# Patient Record
Sex: FEMALE | Race: BLACK OR AFRICAN AMERICAN | NOT HISPANIC OR LATINO | Employment: PART TIME | URBAN - METROPOLITAN AREA
[De-identification: names, ages, dates, MRNs, and addresses within clinical notes are randomized per-mention and may not be internally consistent; named-entity substitution may affect disease eponyms.]

---

## 2017-06-15 ENCOUNTER — OFFICE VISIT (OUTPATIENT)
Dept: FAMILY MEDICINE | Facility: CLINIC | Age: 27
End: 2017-06-15
Payer: MEDICAID

## 2017-06-15 VITALS
SYSTOLIC BLOOD PRESSURE: 119 MMHG | HEART RATE: 94 BPM | DIASTOLIC BLOOD PRESSURE: 76 MMHG | OXYGEN SATURATION: 100 % | RESPIRATION RATE: 20 BRPM | WEIGHT: 134.4 LBS | HEIGHT: 66 IN | BODY MASS INDEX: 21.6 KG/M2 | TEMPERATURE: 99 F

## 2017-06-15 DIAGNOSIS — Z32.01 PREGNANCY TEST POSITIVE: Primary | ICD-10-CM

## 2017-06-15 LAB — BETA HCG QUAL IFA URINE: POSITIVE

## 2017-06-15 PROCEDURE — 99212 OFFICE O/P EST SF 10 MIN: CPT | Performed by: PHYSICIAN ASSISTANT

## 2017-06-15 PROCEDURE — 84703 CHORIONIC GONADOTROPIN ASSAY: CPT | Performed by: PHYSICIAN ASSISTANT

## 2017-06-15 RX ORDER — PRENATAL VIT/IRON FUM/FOLIC AC 27MG-0.8MG
1 TABLET ORAL DAILY
Qty: 100 TABLET | Refills: 3 | Status: SHIPPED | OUTPATIENT
Start: 2017-06-15 | End: 2018-07-09

## 2017-06-15 NOTE — PROGRESS NOTES
SUBJECTIVE:                                                    Reji Snyder is a 27 year old female who presents to clinic today for the following health issues:      Concern - Pregnancy Test         Description:   Pt reports taking a home pregnancy test this morning and it was positive. LMP 05/9/2017.  She is not taking birth control, no other health problems.  No bleeding or abdominal pain, no fever or vomiting.  She does not take any regular medications.  Not yet taking prenatal vitamins              Therapies Tried and outcome:           Problem list and histories reviewed & adjusted, as indicated.  Additional history: as documented    Patient Active Problem List   Diagnosis     Dysmenorrhea     CARDIOVASCULAR SCREENING; LDL GOAL LESS THAN 160     Esophageal reflux     Seasonal allergic rhinitis     Vitamin D deficiency     Cystic acne     ASCUS favor benign     Past Surgical History:   Procedure Laterality Date     ------------OTHER-------------      Female Circumcision       Social History   Substance Use Topics     Smoking status: Never Smoker     Smokeless tobacco: Never Used     Alcohol use No     Family History   Problem Relation Age of Onset     Breast Cancer No family hx of      Cancer - colorectal No family hx of      DIABETES No family hx of      C.A.D. No family hx of          Current Outpatient Prescriptions   Medication Sig Dispense Refill     Prenatal Vit-Fe Fumarate-FA (PRENATAL MULTIVITAMIN  PLUS IRON) 27-0.8 MG TABS per tablet Take 1 tablet by mouth daily 100 tablet 3     omeprazole 20 MG tablet Take 1 tablet (20 mg) by mouth daily Take 30-60 minutes before a meal. (Patient not taking: Reported on 6/15/2017) 90 tablet 1     Cholecalciferol (VITAMIN D) 2000 UNITS tablet Take 2,000 Units by mouth daily (Patient not taking: Reported on 6/15/2017) 90 tablet 1     No Known Allergies    Reviewed and updated as needed this visit by clinical staff       Reviewed and updated as needed this visit by  "Provider         ROS:  Constitutional, HEENT, cardiovascular, pulmonary, gi and gu systems are negative, except as otherwise noted.    OBJECTIVE:                                                    /76 (BP Location: Right arm, Patient Position: Chair, Cuff Size: Adult Regular)  Pulse 94  Temp 99  F (37.2  C) (Tympanic)  Resp 20  Ht 5' 6.25\" (1.683 m)  Wt 134 lb 6.4 oz (61 kg)  LMP 05/05/2017 (Approximate)  SpO2 100%  BMI 21.53 kg/m2  Body mass index is 21.53 kg/(m^2).  GENERAL: healthy, alert and no distress  RESP: lungs clear to auscultation - no rales, rhonchi or wheezes  CV: regular rate and rhythm, normal S1 S2, no S3 or S4, no murmur  Abd: soft non tender, bowel sounds WNL  PSYCH: mentation appears normal, affect normal/bright    Diagnostic Test Results:  Results for orders placed or performed in visit on 06/15/17 (from the past 24 hour(s))   Beta HCG qual IFA urine   Result Value Ref Range    Beta HCG Qual IFA Urine Positive (A) NEG        ASSESSMENT/PLAN:                                                        1. Pregnancy test positive  Test + approx 5 weeks, advised she follow up with OB/GYN for prenatal appointment.  Referral given.  She kari begin daily prenatal vitamin.  Questions answered today.  Discussed foods to avoid, exercise and medications to avoid. She is understanding of next steps in care and will call with questions.  - Beta HCG qual IFA urine  - OB/GYN REFERRAL  - Prenatal Vit-Fe Fumarate-FA (PRENATAL MULTIVITAMIN  PLUS IRON) 27-0.8 MG TABS per tablet; Take 1 tablet by mouth daily  Dispense: 100 tablet; Refill: 3    See Patient Instructions    Jeronimo Garnica PA-C  [facility]  "

## 2017-06-15 NOTE — MR AVS SNAPSHOT
After Visit Summary   6/15/2017    Reji Snyder    MRN: [id number]           Patient Information     Date Of Birth          1990        Visit Information        Provider Department      6/15/2017 12:40 PM Jeronimo Garnica PA-C [facility] [place]        Today's Diagnoses     Pregnancy test positive    -  1      Care Instructions                   Diet During Pregnancy  In this discussion you will learn why you need a well-balanced diet while you are pregnant and what foods you should eat. You will also find out foods you should avoid and foods that will help some of the unpleasant side effects of pregnancy.   What foods do I need to eat?   Eating regular, well-balanced meals is more important when you are pregnant than at any other time of your life. What you eat provides food for your baby as well as yourself. The best time to begin eating a healthy, balanced diet is before you become pregnant.   You need about 300 more food calories a day than when you were not pregnant. Your healthcare provider will suggest a range of weight that you should gain. The usual recommended gain is about 20 to 35 pounds.   Your need for protein while pregnant is about 60 grams (g) a day. Many women already eat this amount or more of protein daily when they are not pregnant. However, if you are vegetarian or eat little meat or dairy, you may not be getting enough protein in your diet. You also need more vitamins and minerals, especially folic acid and iron. These nutrients are important for your baby's growth and development. They give your baby strong bones and teeth, healthy skin, and a healthy body.   Foods that are excellent sources of protein and vitamins are:   beans and peas   nuts   peanut butter   eggs   meat   fish   poultry   cheese, milk, and yogurt   Good sources of folic acid (also called folate) are:   leafy green vegetables, such as bahman greens, spinach, kale, and mustard greens    broccoli   asparagus   fortified breakfast cereals and grains   beans   oranges and strawberries   yellow squash   tomato juice   Foods rich in iron are:   lean red meats, pork, chicken, and fish   fortified cereals   dried fruit   leafy green vegetables   beans   eggs   liver   kidneys   whole-grain or enriched bread   If you need advice on what foods to eat for a healthy, balanced diet, ask your healthcare provider to refer you to a dietician. If you need financial help buying nutritious foods, a government program called the Special Supplemental Food Program for Women, Infants, and Children [place] can help you buy foods like milk, eggs, cheese, and bread.   How do I know if I am eating a balanced diet?   Eat a variety of whole, fresh foods. Use the following as a guideline for what you should eat every day.   Meat, poultry, fish, beans, or eggs   You need 2 to 3 servings every day.   One serving of meat is 2 to 3 ounces (oz) of lean meat, poultry, or fish.   Single servings of other foods in this food group are 1 cup cooked beans, 2 eggs, 4 egg whites, 1/2 cup tofu, 1/2 cup nuts, or 1/4 cup peanut butter. Note that nuts and peanut butter, although healthy, are very high in calories and should be eaten in moderation, especially if you are gaining more weight than your healthcare provider recommends.   Grains, rice, pasta, bread   It is good to have 6 to 9 servings every day.   One serving is 1/2 cup pasta, 1/2 cup cooked cereal, or 1 slice of bread.   Choose less-processed, higher-fiber whole grains more often.   Fruits   You need 3 or more servings of fruits every day.   One serving of fruit is 1 medium apple, 1 medium banana, 1/2 cup chopped fruit, or 3/4 cup fruit juice.   Vegetables   You need 3 or more servings of vegetables every day.   In general, 1 cup of cooked or raw vegetables or vegetable juice or 2 cups of raw leafy vegetables would be considered a serving.   Milk, cheese, or yogurt   You need 3 to 4  servings every day.   One serving is 1 cup of milk, 1 cup of yogurt, 1 and 1/2 ounces of hard cheese, or 2 ounces of processed cheese. It's best to choose low-fat or nonfat dairy products.   What if I am gaining more weight than my provider recommends?   Keep eating the recommended servings for all of the food groups, but make lower fat choices.   Avoid high-fat, high-sugar treats and high-calorie drinks, such as soda pop or large servings of juice.   Get enough exercise at the level your healthcare provider recommends.   For a more individualized approach to meal planning during your pregnancy, go to MyPyramid Plan for Moms at http://www.mypyramid.gov/mypyramidmoms/pyramidmoms_plan.aspx          Follow-ups after your visit        Additional Services     OB/GYN REFERRAL       Your provider has referred you to:  [facility]: [facility] [phone]  [email or web address]    Please be aware that coverage of these services is subject to the terms and limitations of your health insurance plan.  Call member services at your health plan with any benefit or coverage questions.      Please bring the following with you to your appointment:    (1) Any X-Rays, CTs or MRIs which have been performed.  Contact the facility where they were done to arrange for  prior to your scheduled appointment.   (2) List of current medications   (3) This referral request   (4) Any documents/labs given to you for this referral                  Who to contact     If you have questions or need follow up information about today's clinic visit or your schedule please contact [facility] [place] directly at [phone].  Normal or non-critical lab and imaging results will be communicated to you by MyChart, letter or phone within 4 business days after the clinic has received the results. If you do not hear from us within 7 days, please contact the clinic through MyChart or phone. If  "you have a critical or abnormal lab result, we will notify you by phone as soon as possible.  Submit refill requests through [employer] or call your pharmacy and they will forward the refill request to us. Please allow 3 business days for your refill to be completed.          Additional Information About Your Visit        [facility] [employer] Information     [employer] lets you send messages to your doctor, view your test results, renew your prescriptions, schedule appointments and more. To sign up, go to [email or web address] . Click on \"Log in\" on the left side of the screen, which will take you to the Welcome page. Then click on \"Sign up Now\" on the right side of the page.     You will be asked to enter the access code listed below, as well as some personal information. Please follow the directions to create your username and password.     Your access code is: [email or web address]  Expires: 2017  1:06 PM     Your access code will  in 90 days. If you need help or a new code, please call your [facility] clinic or [phone].        Care EveryWhere ID     This is your Care EveryWhere ID. This could be used by other organizations to access your [facility] medical records  [id number]        Your Vitals Were     Pulse Temperature Respirations Height Last Period Pulse Oximetry    94 99  F (37.2  C) (Tympanic) 20 5' 6.25\" (1.683 m) 2017 (Approximate) 100%    BMI (Body Mass Index)                   21.53 kg/m2            Blood Pressure from Last 3 Encounters:   06/15/17 119/76   16 119/75   16 107/72    Weight from Last 3 Encounters:   06/15/17 134 lb 6.4 oz (61 kg)   16 126 lb (57.2 kg)   16 125 lb (56.7 kg)              We Performed the Following     Beta HCG qual IFA urine     OB/GYN REFERRAL          Today's Medication Changes          These changes are accurate as of: 6/15/17  1:06 PM.  If you have any questions, ask your nurse or doctor.               Start taking these medicines.        " Dose/Directions    prenatal multivitamin  plus iron 27-0.8 MG Tabs per tablet   Used for:  Pregnancy test positive   Started by:  Jeronimo Garnica PA-C        Dose:  1 tablet   Take 1 tablet by mouth daily   Quantity:  100 tablet   Refills:  3            Where to get your medicines      These medications were sent to [id number] - [place], [place] - [address] AT [address]  [address] [address]     Phone:  [phone]     prenatal multivitamin  plus iron 27-0.8 MG Tabs per tablet                Primary Care Provider Office Phone # Fax #    Aide Rodriguez DEJAN [phone]       [address]  [place] [address]        Thank you!     [facility] you [facility]. Our goal is always to provide you with excellent care. Hearing back from our patients is one way we can continue to improve our services. Please take a few minutes to complete the written survey that you may receive in the mail after your visit with us. Thank you!             Your Updated Medication List - Protect others around you: Learn how to safely use, store and throw away your medicines at www.disposemymeds.org.          This list is accurate as of: 6/15/17  1:06 PM.  Always use your most recent med list.                   Brand Name Dispense Instructions for use    omeprazole 20 MG tablet     90 tablet    Take 1 tablet (20 mg) by mouth daily Take 30-60 minutes before a meal.       prenatal multivitamin  plus iron 27-0.8 MG Tabs per tablet     100 tablet    Take 1 tablet by mouth daily       vitamin D 2000 UNITS tablet     90 tablet    Take 2,000 Units by mouth daily

## 2017-06-15 NOTE — PATIENT INSTRUCTIONS
Diet During Pregnancy  In this discussion you will learn why you need a well-balanced diet while you are pregnant and what foods you should eat. You will also find out foods you should avoid and foods that will help some of the unpleasant side effects of pregnancy.   What foods do I need to eat?   Eating regular, well-balanced meals is more important when you are pregnant than at any other time of your life. What you eat provides food for your baby as well as yourself. The best time to begin eating a healthy, balanced diet is before you become pregnant.   You need about 300 more food calories a day than when you were not pregnant. Your healthcare provider will suggest a range of weight that you should gain. The usual recommended gain is about 20 to 35 pounds.   Your need for protein while pregnant is about 60 grams (g) a day. Many women already eat this amount or more of protein daily when they are not pregnant. However, if you are vegetarian or eat little meat or dairy, you may not be getting enough protein in your diet. You also need more vitamins and minerals, especially folic acid and iron. These nutrients are important for your baby's growth and development. They give your baby strong bones and teeth, healthy skin, and a healthy body.   Foods that are excellent sources of protein and vitamins are:   beans and peas   nuts   peanut butter   eggs   meat   fish   poultry   cheese, milk, and yogurt   Good sources of folic acid (also called folate) are:   leafy green vegetables, such as bahman greens, spinach, kale, and mustard greens   broccoli   asparagus   fortified breakfast cereals and grains   beans   oranges and strawberries   yellow squash   tomato juice   Foods rich in iron are:   lean red meats, pork, chicken, and fish   fortified cereals   dried fruit   leafy green vegetables   beans   eggs   liver   kidneys   whole-grain or enriched bread   If you need advice on what foods to eat for a  healthy, balanced diet, ask your healthcare provider to refer you to a dietician. If you need financial help buying nutritious foods, a government program called the Special Supplemental Food Program for Women, Infants, and Children ([place]) can help you buy foods like milk, eggs, cheese, and bread.   How do I know if I am eating a balanced diet?   Eat a variety of whole, fresh foods. Use the following as a guideline for what you should eat every day.   Meat, poultry, fish, beans, or eggs   You need 2 to 3 servings every day.   One serving of meat is 2 to 3 ounces (oz) of lean meat, poultry, or fish.   Single servings of other foods in this food group are 1 cup cooked beans, 2 eggs, 4 egg whites, 1/2 cup tofu, 1/2 cup nuts, or 1/4 cup peanut butter. Note that nuts and peanut butter, although healthy, are very high in calories and should be eaten in moderation, especially if you are gaining more weight than your healthcare provider recommends.   Grains, rice, pasta, bread   It is good to have 6 to 9 servings every day.   One serving is 1/2 cup pasta, 1/2 cup cooked cereal, or 1 slice of bread.   Choose less-processed, higher-fiber whole grains more often.   Fruits   You need 3 or more servings of fruits every day.   One serving of fruit is 1 medium apple, 1 medium banana, 1/2 cup chopped fruit, or 3/4 cup fruit juice.   Vegetables   You need 3 or more servings of vegetables every day.   In general, 1 cup of cooked or raw vegetables or vegetable juice or 2 cups of raw leafy vegetables would be considered a serving.   Milk, cheese, or yogurt   You need 3 to 4 servings every day.   One serving is 1 cup of milk, 1 cup of yogurt, 1 and 1/2 ounces of hard cheese, or 2 ounces of processed cheese. It's best to choose low-fat or nonfat dairy products.   What if I am gaining more weight than my provider recommends?   Keep eating the recommended servings for all of the food groups, but make lower fat choices.   Avoid high-fat,  high-sugar treats and high-calorie drinks, such as soda pop or large servings of juice.   Get enough exercise at the level your healthcare provider recommends.   For a more individualized approach to meal planning during your pregnancy, go to MyPyramid Plan for Moms at http://www.mypyramid.gov/mypyramidmoms/pyramidmoms_plan.aspx

## 2017-06-15 NOTE — NURSING NOTE
"Chief Complaint   Patient presents with     Pregnancy Test       Initial /76 (BP Location: Right arm, Patient Position: Chair, Cuff Size: Adult Regular)  Pulse 94  Temp 99  F (37.2  C) (Tympanic)  Resp 20  Ht 5' 6.25\" (1.683 m)  Wt 134 lb 6.4 oz (61 kg)  LMP 05/05/2017 (Approximate)  SpO2 100%  BMI 21.53 kg/m2 Estimated body mass index is 21.53 kg/(m^2) as calculated from the following:    Height as of this encounter: 5' 6.25\" (1.683 m).    Weight as of this encounter: 134 lb 6.4 oz (61 kg).  Medication Reconciliation: complete    Latisha Zazueta MA  "

## 2017-08-09 ENCOUNTER — TELEPHONE (OUTPATIENT)
Dept: NURSING | Facility: CLINIC | Age: 27
End: 2017-08-09

## 2017-08-09 NOTE — TELEPHONE ENCOUNTER
"Pt calling in with complaints of morning sickness. She says she has not been seen in the office yet. Has appt later this month. She feels she is about 8 weeks pregnant. Reviewed \"eating tips for morning sickness\" . Also suggested the Vitamin B6  3x daily.  Pt voiced understanding.  "

## 2017-08-21 DIAGNOSIS — O36.80X0 ENCOUNTER TO DETERMINE FETAL VIABILITY OF PREGNANCY, NOT APPLICABLE OR UNSPECIFIED FETUS: Primary | ICD-10-CM

## 2017-08-24 ENCOUNTER — PRENATAL OFFICE VISIT (OUTPATIENT)
Dept: OBGYN | Facility: CLINIC | Age: 27
End: 2017-08-24
Payer: COMMERCIAL

## 2017-08-24 ENCOUNTER — RADIANT APPOINTMENT (OUTPATIENT)
Dept: ULTRASOUND IMAGING | Facility: CLINIC | Age: 27
End: 2017-08-24
Payer: COMMERCIAL

## 2017-08-24 VITALS
HEIGHT: 66 IN | BODY MASS INDEX: 21.18 KG/M2 | SYSTOLIC BLOOD PRESSURE: 108 MMHG | DIASTOLIC BLOOD PRESSURE: 62 MMHG | HEART RATE: 88 BPM | WEIGHT: 131.8 LBS

## 2017-08-24 DIAGNOSIS — O36.80X0 ENCOUNTER TO DETERMINE FETAL VIABILITY OF PREGNANCY, NOT APPLICABLE OR UNSPECIFIED FETUS: ICD-10-CM

## 2017-08-24 DIAGNOSIS — Z34.01 ENCOUNTER FOR SUPERVISION OF NORMAL FIRST PREGNANCY IN FIRST TRIMESTER: Primary | ICD-10-CM

## 2017-08-24 PROBLEM — Z34.00 SUPERVISION OF NORMAL IUP (INTRAUTERINE PREGNANCY) IN PRIMIGRAVIDA: Status: ACTIVE | Noted: 2017-08-24

## 2017-08-24 LAB
ABO + RH BLD: NORMAL
ABO + RH BLD: NORMAL
ALBUMIN UR-MCNC: NEGATIVE MG/DL
APPEARANCE UR: CLEAR
BACTERIA #/AREA URNS HPF: ABNORMAL /HPF
BASOPHILS # BLD AUTO: 0 10E9/L (ref 0–0.2)
BASOPHILS NFR BLD AUTO: 0.1 %
BILIRUB UR QL STRIP: NEGATIVE
BLD GP AB SCN SERPL QL: NORMAL
BLOOD BANK CMNT PATIENT-IMP: NORMAL
COLOR UR AUTO: YELLOW
DIFFERENTIAL METHOD BLD: NORMAL
EOSINOPHIL # BLD AUTO: 0.1 10E9/L (ref 0–0.7)
EOSINOPHIL NFR BLD AUTO: 0.9 %
ERYTHROCYTE [DISTWIDTH] IN BLOOD BY AUTOMATED COUNT: 12.9 % (ref 10–15)
GLUCOSE UR STRIP-MCNC: NEGATIVE MG/DL
HCT VFR BLD AUTO: 37.8 % (ref 35–47)
HGB BLD-MCNC: 13.4 G/DL (ref 11.7–15.7)
HGB UR QL STRIP: ABNORMAL
KETONES UR STRIP-MCNC: NEGATIVE MG/DL
LEUKOCYTE ESTERASE UR QL STRIP: NEGATIVE
LYMPHOCYTES # BLD AUTO: 1.6 10E9/L (ref 0.8–5.3)
LYMPHOCYTES NFR BLD AUTO: 22.6 %
MCH RBC QN AUTO: 30.7 PG (ref 26.5–33)
MCHC RBC AUTO-ENTMCNC: 35.4 G/DL (ref 31.5–36.5)
MCV RBC AUTO: 87 FL (ref 78–100)
MONOCYTES # BLD AUTO: 0.6 10E9/L (ref 0–1.3)
MONOCYTES NFR BLD AUTO: 7.8 %
MUCOUS THREADS #/AREA URNS LPF: PRESENT /LPF
NEUTROPHILS # BLD AUTO: 4.8 10E9/L (ref 1.6–8.3)
NEUTROPHILS NFR BLD AUTO: 68.6 %
NITRATE UR QL: NEGATIVE
NON-SQ EPI CELLS #/AREA URNS LPF: ABNORMAL /LPF
PH UR STRIP: 6.5 PH (ref 5–7)
PLATELET # BLD AUTO: 245 10E9/L (ref 150–450)
RBC # BLD AUTO: 4.36 10E12/L (ref 3.8–5.2)
RBC #/AREA URNS AUTO: ABNORMAL /HPF
SOURCE: ABNORMAL
SP GR UR STRIP: 1.02 (ref 1–1.03)
SPECIMEN EXP DATE BLD: NORMAL
UROBILINOGEN UR STRIP-ACNC: 0.2 EU/DL (ref 0.2–1)
WBC # BLD AUTO: 7 10E9/L (ref 4–11)
WBC #/AREA URNS AUTO: ABNORMAL /HPF

## 2017-08-24 PROCEDURE — 36415 COLL VENOUS BLD VENIPUNCTURE: CPT | Performed by: NURSE PRACTITIONER

## 2017-08-24 PROCEDURE — 87389 HIV-1 AG W/HIV-1&-2 AB AG IA: CPT | Performed by: NURSE PRACTITIONER

## 2017-08-24 PROCEDURE — 87340 HEPATITIS B SURFACE AG IA: CPT | Performed by: NURSE PRACTITIONER

## 2017-08-24 PROCEDURE — 76817 TRANSVAGINAL US OBSTETRIC: CPT | Performed by: OBSTETRICS & GYNECOLOGY

## 2017-08-24 PROCEDURE — 86762 RUBELLA ANTIBODY: CPT | Performed by: NURSE PRACTITIONER

## 2017-08-24 PROCEDURE — 86850 RBC ANTIBODY SCREEN: CPT | Performed by: NURSE PRACTITIONER

## 2017-08-24 PROCEDURE — 87086 URINE CULTURE/COLONY COUNT: CPT | Performed by: NURSE PRACTITIONER

## 2017-08-24 PROCEDURE — 86780 TREPONEMA PALLIDUM: CPT | Performed by: NURSE PRACTITIONER

## 2017-08-24 PROCEDURE — 81001 URINALYSIS AUTO W/SCOPE: CPT | Performed by: NURSE PRACTITIONER

## 2017-08-24 PROCEDURE — 88175 CYTOPATH C/V AUTO FLUID REDO: CPT | Performed by: NURSE PRACTITIONER

## 2017-08-24 PROCEDURE — 86901 BLOOD TYPING SEROLOGIC RH(D): CPT | Performed by: NURSE PRACTITIONER

## 2017-08-24 PROCEDURE — 86900 BLOOD TYPING SEROLOGIC ABO: CPT | Performed by: NURSE PRACTITIONER

## 2017-08-24 PROCEDURE — 99207 ZZC FIRST OB VISIT: CPT | Performed by: NURSE PRACTITIONER

## 2017-08-24 PROCEDURE — 85025 COMPLETE CBC W/AUTO DIFF WBC: CPT | Performed by: NURSE PRACTITIONER

## 2017-08-24 ASSESSMENT — PATIENT HEALTH QUESTIONNAIRE - PHQ9: SUM OF ALL RESPONSES TO PHQ QUESTIONS 1-9: 0

## 2017-08-24 NOTE — LETTER
September 12, 2017      Reji Snyder  [address] [place] [address]    Dear ,      I am happy to inform you that your recent cervical cancer screening test (PAP smear) was normal.      Preventative screenings such as this help to ensure your health for years to come. You should repeat a pap smear in 1 year or at your post partum visit, unless otherwise directed.      You will still need to return to the clinic every year for your annual exam and other preventive tests.     Please contact the clinic at [phone] if you have further questions.       Sincerely,      Olga Merlos, DEJAN CNP/rlm

## 2017-08-24 NOTE — PROGRESS NOTES
This is a 27 year old female patient,   who presents for her first obstetrical visit.    Patient's last menstrual period was 2017..  This gives her an EDC of 2018 .  She is 16w3d weeks.  Her cycles are regular.  Her last menstrual period was normal.  She has had an ultrasound on Aug 24, 2017 which showed 16w3d. .  Since her LMP, she has experienced  nausea, emesis, fatigue, lightheadedness and weight loss bloating,).  She denies abdominal pain, headache, vaginal discharge, dysuria, pelvic pain, urinary urgency, urinary frequency, vaginal bleeding, hemorrhoids and constipation.        Past History:  Her past medical history   Past Medical History:   Diagnosis Date     ASCUS favor benign 16    Neg HPV. 3 yr co-testing     Constipation      Cystic acne      Dysmenorrhea      Esophageal reflux      Female circumcision      Seasonal allergic rhinitis      Vitamin D deficiency    .      This is her first pregnancy    Since her last LMP she denies use of alcohol, tobacco and street drugs.    HISTORY:  Obstetric History       T0      L0     SAB0   TAB0   Ectopic0   Multiple0   Live Births0       # Outcome Date GA Lbr Ed/2nd Weight Sex Delivery Anes PTL Lv   1 Current                 Past Medical History:   Diagnosis Date     ASCUS favor benign 16    Neg HPV. 3 yr co-testing     Constipation      Cystic acne      Dysmenorrhea      Esophageal reflux      Female circumcision      Seasonal allergic rhinitis      Vitamin D deficiency      Past Surgical History:   Procedure Laterality Date     ------------OTHER-------------      Female Circumcision     Family History   Problem Relation Age of Onset     Breast Cancer No family hx of      Cancer - colorectal No family hx of      DIABETES No family hx of      C.A.D. No family hx of      Social History     Social History     Marital status: Single     Spouse name: N/A     Number of children: 0     Years of education: 10  "    Occupational History           [employer]     Social History Main Topics     Smoking status: Never Smoker     Smokeless tobacco: Never Used     Alcohol use None     Drug use: No     Sexual activity: Yes     Partners: Male     Other Topics Concern      Service No     Blood Transfusions No     Caffeine Concern No     1-2 cups of tea per day     Occupational Exposure No     Hobby Hazards No     Sleep Concern No     Stress Concern No     Weight Concern No     Special Diet No     Back Care No     Exercise Yes     3-5 times per week - walking     Bike Helmet No     n/a     Seat Belt Yes     Self-Exams No     Social History Narrative     Current Outpatient Prescriptions   Medication Sig     Prenatal Vit-Fe Fumarate-FA (PRENATAL MULTIVITAMIN  PLUS IRON) 27-0.8 MG TABS per tablet Take 1 tablet by mouth daily     omeprazole 20 MG tablet Take 1 tablet (20 mg) by mouth daily Take 30-60 minutes before a meal.     Cholecalciferol (VITAMIN D) 2000 UNITS tablet Take 2,000 Units by mouth daily (Patient not taking: Reported on 6/15/2017)     No current facility-administered medications for this visit.      No Known Allergies    Past medical, surgical, social and family history were reviewed and updated in EPIC.    ROS:   12 point review of systems negative other than symptoms noted below.  Constitutional: Fatigue and Weight Loss  Cardiovascular: Lightheadedness  Gastrointestinal: Bloating, Nausea and Vomiting    EXAM:  /62  Pulse 88  Ht 5' 6\" (1.676 m)  Wt 131 lb 12.8 oz (59.8 kg)  LMP 05/28/2017  BMI 21.27 kg/m2   BMI: Body mass index is 21.27 kg/(m^2).    EXAM:  Constitutional:  Appearance: Well nourished, well developed alert, in no acute distress.  Neck:   Lymph Nodes:  No lymphadenopathy present.    Thyroid:  Gland size normal, nontender, no nodules or masses present  on palpation.  Chest:  Respiratory Effort:  Breathing unlabored.  Cardiovascular:  Heart Auscultation:  Regular rate, normal " rhythm, no murmurs    present.  Breasts: Inspection of Breasts:  No lymphadenopathy present., Palpation of Breasts and Axillae:  No masses present on palpation, no breast tenderness., Axillary Lymph Nodes:  No lymphadenopathy present. and No nodularity, asymmetry or nipple discharge bilaterally.    Axillary Lymph Nodes:  No lymphadenopathy present.  Gastrointestinal:  Abdominal Examination:  Abdomen nontender to palpation, tone  normal without rigidity or guarding, no masses present, umbilicus without  Lesions.    Liver and speen:  No hepatomegaly present, liver nontender to  palpation.    Hernias:  No hernias present.  Lymphatic: Lymph Nodes:  No other lymphadenopathy present.  Skin:  General Inspection:  No rashes present, no lesions present, no areas of  discoloration.    Genitalia and Groin:  No rashes present, no lesions present, no areas of  discoloration, no masses present.  Neurologic/Psychiatric:    Mental Status:  Oriented X3.    Pelvic Exam:  External Genitalia:     Normal appearance for age, no discharge present, no tenderness present, no inflammatory lesions present, color normal  Vagina:     Normal vaginal vault without central or paravaginal defects, no discharge present, no inflammatory lesions present, no masses present  Bladder:     Nontender to palpation  Urethra:   Urethral Body:  Urethra palpation normal, urethra structural support normal   Urethral Meatus:  No erythema or lesions present  Cervix:     Appearance healthy, no lesions present, nontender to palpation, no bleeding present cervix closed.  Uterus:     Uterus: firm, 16 week sized and nontender, anteverted in position.   Adnexa:     No adnexal tenderness present, no adnexal masses present  Perineum:     Perineum within normal limits, no evidence of trauma, no rashes or skin lesions present  Anus:     Anus within normal limits, no hemorrhoids present  Inguinal Lymph Nodes:     No lymphadenopathy present  Pubic Hair:     Normal pubic hair  distribution for age  Genitalia and Groin:     No rashes present, no lesions present, no areas of discoloration, no masses present      ASSESSMENT:    No diagnosis found.    PLAN/PATIENT INSTRUCTIONS:    There are no Patient Instructions on file for this visit.    Normal pregnancy exam 16 weeks size uterus.  Return in 1 month for OB visit.    DEJAN Bloom CNP

## 2017-08-24 NOTE — MR AVS SNAPSHOT
After Visit Summary   8/24/2017    Reji Snyder    MRN: [id number]           Patient Information     Date Of Birth          1990        Visit Information        Provider Department      8/24/2017 11:00 AM Olga Merlos APRN CNP; WE TRIAGE [facility]        Today's Diagnoses     Encounter for supervision of normal first pregnancy in first trimester    -  1       Follow-ups after your visit        Follow-up notes from your care team     Return in about 4 weeks (around 9/21/2017) for OB visit..      Your next 10 appointments already scheduled     Sep 21, 2017 10:00 AM CDT   US PELVIC COMPLETE W TRANSVAGINAL with WEUS1   [facility] ([facility])    [address] [place] [address] [phone]           Please bring a list of your medicines (including vitamins, minerals and over-the-counter drugs). Also, tell your doctor about any allergies you may have. Wear comfortable clothes and leave your valuables at home.  Adults: Drink six 8-ounce glasses of fluid one hour before your exam. Do NOT empty your bladder.  If you need to empty your bladder before your exam, try to release only a little bit of urine. Then, drink another 8oz glass of fluid.  Children: Children who are potty trained should drink at least 4 cups (32 oz) of liquid 45 minutes to one hour prior to the exam. The child s bladder must be full in order to achieve a diagnostic exam. If your child is very uncomfortable or has an urgent need to pee, please notify a technologist; they will try to find out how much longer the wait may be and provide instructions to help relieve the pressure. Occasionally it is medically necessary to insert a urinary catheter to fill the bladder.  Please call the Imaging Department at your exam site with any questions.            Sep 21, 2017 10:50 AM CDT   ESTABLISHED PRENATAL with Amita Foleys, DO [place]  "[facility] [place] ([facility])    [address] [address] [phone]              Who to contact     If you have questions or need follow up information about today's clinic visit or your schedule please contact [facility] directly at [phone].  Normal or non-critical lab and imaging results will be communicated to you by MyChart, letter or phone within 4 business days after the clinic has received the results. If you do not hear from us within 7 days, please contact the clinic through Five Apeshart or phone. If you have a critical or abnormal lab result, we will notify you by phone as soon as possible.  Submit refill requests through CITYBIZLIST or call your pharmacy and they will forward the refill request to us. Please allow 3 business days for your refill to be completed.          Additional Information About Your Visit        CITYBIZLIST Information     CITYBIZLIST lets you send messages to your doctor, view your test results, renew your prescriptions, schedule appointments and more. To sign up, go to [email or web address] . Click on \"Log in\" on the left side of the screen, which will take you to the Welcome page. Then click on \"Sign up Now\" on the right side of the page.     You will be asked to enter the access code listed below, as well as some personal information. Please follow the directions to create your username and password.     Your access code is: [email or web address]  Expires: 2017  1:06 PM     Your access code will  in 90 days. If you need help or a new code, please call your [place] clinic or [phone].        Care EveryWhere ID     This is your Care EveryWhere ID. This could be used by other organizations to access your [place] medical records  [id number]        Your Vitals Were     Pulse Height Last Period BMI (Body Mass Index)          88 5' 6\" (1.676 m) 2017 21.27 kg/m2         Blood Pressure from Last 3 " Encounters:   08/24/17 108/62   06/15/17 119/76   04/18/16 119/75    Weight from Last 3 Encounters:   08/24/17 131 lb 12.8 oz (59.8 kg)   06/15/17 134 lb 6.4 oz (61 kg)   04/18/16 126 lb (57.2 kg)              We Performed the Following     ABO/Rh type and screen     Anti Treponema     CBC with platelets differential     Hepatitis B surface antigen     HIV Antigen Antibody Combo     Pap imaged thin layer diagnostic reflex to HPV if ASCUS     Rubella Antibody IgG Quantitative     UA with Microscopic     Urine Culture Aerobic Bacterial        Primary Care Provider Office Phone # Fax #    DEJAN Ag [phone]       XX RETIRED XX  [place] [address]        Equal Access to Services     BILL OTERO : Jeremias lopezo Sorocio, waaxda luqadaha, qaybta kaalmada adeegyada, erica white . So [place] [phone].    ATENCIÓN: Si habla español, tiene a cheek disposición servicios gratuitos de asistencia lingüística. [facility] [phone].    We comply with applicable federal civil rights laws and [place] laws. We do not discriminate on the basis of race, color, national origin, age, disability sex, sexual orientation or gender identity.            Thank you!     Thank you for choosing [facility]  for your care. Our goal is always to provide you with excellent care. Hearing back from our patients is one way we can continue to improve our services. Please take a few minutes to complete the written survey that you may receive in the mail after your visit with us. Thank you!             Your Updated Medication List - Protect others around you: Learn how to safely use, store and throw away your medicines at www.disposemymeds.org.          This list is accurate as of: 8/24/17  1:21 PM.  Always use your most recent med list.                   Brand Name Dispense Instructions for use Diagnosis    omeprazole 20 MG tablet     90 tablet    Take 1 tablet (20 mg)  by mouth daily Take 30-60 minutes before a meal.    Gastroesophageal reflux disease without esophagitis       prenatal multivitamin plus iron 27-0.8 MG Tabs per tablet     100 tablet    Take 1 tablet by mouth daily    Pregnancy test positive       vitamin D 2000 UNITS tablet     90 tablet    Take 2,000 Units by mouth daily    Vitamin D deficiency

## 2017-08-24 NOTE — NURSING NOTE
[facility] Obstetrical Risk History    Patient presents for new OB labs and teaching.      1. Please indicate any condition you have or have had in the past:  Abnormal Pap,  2. Do you smoke?  No  If yes, how many packs/day?   3. Do you drink alcoholic beverages? No  If yes, how often?  What type?   4. List any medications taken since your last period: prilosec, , prenatal, vit D  5. List any recreational drugs (cocaine, marijuana, etc. used since your last period:None    6. List any chemical or radiation exposure that you've encountered: None  7. Are you on a restricted diet? No  If yes, please describe:  Do you have any Orthodoxy objections to any form of treatment? No    GENETIC SCREENING    These questions apply to you, the baby's father, or anyone in either family with:    1. Patient's age 35 or greater at delivery? No  2. Faroese, Estonian, Mediterranean ancestry? No  3. Neural Tube Defect (Meningomyelocele, Spina Bifida, or Anencephaly)? No  4. Yazidism, Citizen of [place] Anza or history of Bharath-Sachs disease? No  5. Down's Syndrome?   No  6. Hemophilia or clotting disorder? No  7. Muscular Dystrophy? No  8. Cystic Fibrosis? No  9. [place]'s Chorea? No  10. Mental Retardation? No  11. 3 or more miscarriages or a stillborn? No  12. Other inherited disease or chromosomal disorder? No  13. Have you or the baby's father had a child born with a birth defect? No  14. Did you or the baby's father have a birth defect yourselves? No    Do you have any other concerns about birth defects? No

## 2017-08-25 LAB
BACTERIA SPEC CULT: NO GROWTH
HBV SURFACE AG SERPL QL IA: NONREACTIVE
HIV 1+2 AB+HIV1 P24 AG SERPL QL IA: NONREACTIVE
Lab: NORMAL
RUBV IGG SERPL IA-ACNC: 48 IU/ML
SPECIMEN SOURCE: NORMAL
T PALLIDUM IGG+IGM SER QL: NEGATIVE

## 2017-08-28 LAB
COPATH REPORT: NORMAL
PAP: NORMAL

## 2017-09-21 ENCOUNTER — PRENATAL OFFICE VISIT (OUTPATIENT)
Dept: OBGYN | Facility: CLINIC | Age: 27
End: 2017-09-21
Payer: COMMERCIAL

## 2017-09-21 ENCOUNTER — RADIANT APPOINTMENT (OUTPATIENT)
Dept: ULTRASOUND IMAGING | Facility: CLINIC | Age: 27
End: 2017-09-21
Payer: COMMERCIAL

## 2017-09-21 VITALS — BODY MASS INDEX: 21.63 KG/M2 | DIASTOLIC BLOOD PRESSURE: 59 MMHG | SYSTOLIC BLOOD PRESSURE: 106 MMHG | WEIGHT: 134 LBS

## 2017-09-21 DIAGNOSIS — Z34.02 ENCOUNTER FOR SUPERVISION OF NORMAL FIRST PREGNANCY IN SECOND TRIMESTER: ICD-10-CM

## 2017-09-21 DIAGNOSIS — Z36.89 ENCOUNTER FOR FETAL ANATOMIC SURVEY: ICD-10-CM

## 2017-09-21 PROCEDURE — 76805 OB US >/= 14 WKS SNGL FETUS: CPT | Performed by: OBSTETRICS & GYNECOLOGY

## 2017-09-21 PROCEDURE — 99207 ZZC PRENATAL VISIT: CPT | Performed by: OBSTETRICS & GYNECOLOGY

## 2017-09-21 NOTE — MR AVS SNAPSHOT
"              After Visit Summary   9/21/2017    Reji Snyder    MRN: [id number]           Patient Information     Date Of Birth          1990        Visit Information        Provider Department      9/21/2017 10:50 AM Amita Lundberg DO [facility]        Today's Diagnoses     Encounter for supervision of normal first pregnancy in second trimester           Follow-ups after your visit        Follow-up notes from your care team     Return in about 4 weeks (around 10/19/2017).      Your next 10 appointments already scheduled     Oct 19, 2017 10:30 AM CDT   ESTABLISHED PRENATAL with Amita Lundberg DO   [facility] ([facility])    [address] [place] [address] [phone]              Who to contact     If you have questions or need follow up information about today's clinic visit or your schedule please contact [facility] [place] directly at [phone].  Normal or non-critical lab and imaging results will be communicated to you by Wellbeatshart, letter or phone within 4 business days after the clinic has received the results. If you do not hear from us within 7 days, please contact the clinic through Nvestt or phone. If you have a critical or abnormal lab result, we will notify you by phone as soon as possible.  Submit refill requests through Znode or call your pharmacy and they will forward the refill request to us. Please allow 3 business days for your refill to be completed.          Additional Information About Your Visit        MyChart Information     Znode lets you send messages to your doctor, view your test results, renew your prescriptions, schedule appointments and more. To sign up, go to [email or web address] . Click on \"Log in\" on the left side of the screen, which will take you to the Welcome page. Then click on \"Sign up Now\" on the right side of the page.     You will be asked to " enter the access code listed below, as well as some personal information. Please follow the directions to create your username and password.     Your access code is: [email or web address]  Expires: 2017 11:13 AM     Your access code will  in 90 days. If you need help or a new code, please call your [place] clinic or [phone].        Care EveryWhere ID     This is your Care EveryWhere ID. This could be used by other organizations to access your [place] medical records  [id number]        Your Vitals Were     Last Period BMI (Body Mass Index)                2017 21.63 kg/m2           Blood Pressure from Last 3 Encounters:   17 106/59   17 108/62   06/15/17 119/76    Weight from Last 3 Encounters:   17 134 lb (60.8 kg)   17 131 lb 12.8 oz (59.8 kg)   06/15/17 134 lb 6.4 oz (61 kg)              Today, you had the following     No orders found for display         Today's Medication Changes          These changes are accurate as of: 17 11:13 AM.  If you have any questions, ask your nurse or doctor.               Stop taking these medicines if you haven't already. Please contact your care team if you have questions.     vitamin D 2000 UNITS tablet   Stopped by:  Amita Lundberg, DO                    Primary Care Provider Office Phone # Fax #    Aide Rodriguez APRTOYA [phone]       XX RETIRED XX  [facility] [address]        Equal Access to Services     [facility] AH: Hadii aad ku hadasho Soomaali, waaxda luqadaha, qaybta kaalmada janetteegyada, erica white . So [facility] [phone].    ATENCIÓN: Si habla español, tiene a cheek disposición servicios gratuitos de asistencia lingüística. Llame al [phone].    We comply with applicable federal civil rights laws and [place] laws. We do not discriminate on the basis of race, color, national origin, age, disability sex, sexual orientation or gender identity.            Thank you!     Thank  you for choosing [facility] [place]  for your care. Our goal is always to provide you with excellent care. Hearing back from our patients is one way we can continue to improve our services. Please take a few minutes to complete the written survey that you may receive in the mail after your visit with us. Thank you!             Your Updated Medication List - Protect others around you: Learn how to safely use, store and throw away your medicines at www.disposemymeds.org.          This list is accurate as of: 9/21/17 11:13 AM.  Always use your most recent med list.                   Brand Name Dispense Instructions for use Diagnosis    omeprazole 20 MG tablet     90 tablet    Take 1 tablet (20 mg) by mouth daily Take 30-60 minutes before a meal.    Gastroesophageal reflux disease without esophagitis       prenatal multivitamin plus iron 27-0.8 MG Tabs per tablet     100 tablet    Take 1 tablet by mouth daily    Pregnancy test positive

## 2017-11-02 ENCOUNTER — PRENATAL OFFICE VISIT (OUTPATIENT)
Dept: OBGYN | Facility: CLINIC | Age: 27
End: 2017-11-02
Payer: COMMERCIAL

## 2017-11-02 VITALS — BODY MASS INDEX: 22.11 KG/M2 | SYSTOLIC BLOOD PRESSURE: 94 MMHG | DIASTOLIC BLOOD PRESSURE: 54 MMHG | WEIGHT: 137 LBS

## 2017-11-02 DIAGNOSIS — Z23 NEED FOR PROPHYLACTIC VACCINATION AND INOCULATION AGAINST INFLUENZA: ICD-10-CM

## 2017-11-02 DIAGNOSIS — Z34.02 ENCOUNTER FOR SUPERVISION OF NORMAL FIRST PREGNANCY IN SECOND TRIMESTER: Primary | ICD-10-CM

## 2017-11-02 PROCEDURE — 90471 IMMUNIZATION ADMIN: CPT | Performed by: OBSTETRICS & GYNECOLOGY

## 2017-11-02 PROCEDURE — 99207 ZZC PRENATAL VISIT: CPT | Performed by: OBSTETRICS & GYNECOLOGY

## 2017-11-02 PROCEDURE — 90686 IIV4 VACC NO PRSV 0.5 ML IM: CPT | Performed by: OBSTETRICS & GYNECOLOGY

## 2017-11-02 NOTE — MR AVS SNAPSHOT
"              After Visit Summary   11/2/2017    Reji Snyder    MRN: [id number]           Patient Information     Date Of Birth          1990        Visit Information        Provider Department      11/2/2017 10:40 AM Aimta Lundberg DO [facility]        Today's Diagnoses     Encounter for supervision of normal first pregnancy in second trimester    -  1    Need for prophylactic vaccination and inoculation against influenza           Follow-ups after your visit        Follow-up notes from your care team     Return in about 2 weeks (around 11/16/2017).      Your next 10 appointments already scheduled     Jan 04, 2018 10:50 AM CST   ESTABLISHED PRENATAL with Amita Lundberg DO   [facility] [place] ([facility])    [address] [place] [address] [phone]              Who to contact     If you have questions or need follow up information about today's clinic visit or your schedule please contact [facility] [place] directly at [phone].  Normal or non-critical lab and imaging results will be communicated to you by Idhasofthart, letter or phone within 4 business days after the clinic has received the results. If you do not hear from us within 7 days, please contact the clinic through Idhasofthart or phone. If you have a critical or abnormal lab result, we will notify you by phone as soon as possible.  Submit refill requests through "[employer] or call your pharmacy and they will forward the refill request to us. Please allow 3 business days for your refill to be completed.          Additional Information About Your Visit        Idhasofthart Information     "[employer] lets you send messages to your doctor, view your test results, renew your prescriptions, schedule appointments and more. To sign up, go to [email or web address]/"[email or web address] . Click on \"Log in\" on the left side of the screen, which will take you to the Welcome page. Then " "click on \"Sign up Now\" on the right side of the page.     You will be asked to enter the access code listed below, as well as some personal information. Please follow the directions to create your username and password.     Your access code is: [email or web address]  Expires: 3/22/2018 10:58 AM     Your access code will  in 90 days. If you need help or a new code, please call your [place] clinic or [phone].        Care EveryWhere ID     This is your Care EveryWhere ID. This could be used by other organizations to access your [place] medical records  [id number]        Your Vitals Were     Last Period BMI (Body Mass Index)                2017 22.11 kg/m2           Blood Pressure from Last 3 Encounters:   17 100/64   17 104/56   17 116/60    Weight from Last 3 Encounters:   17 138 lb (62.6 kg)   17 141 lb (64 kg)   17 139 lb (63 kg)              We Performed the Following     FLU VAC, SPLIT VIRUS IM > 3 YO (QUADRIVALENT) [77729]     Vaccine Administration, Initial [07603]        Primary Care Provider Office Phone # Fax #    DEJAN Ag [phone]       [address] [address]        Equal Access to Services     [facility] : Hadii senia ku hadasho Sowendyali, waaxda luqadaha, qaybta kaalmada sarah, erica white . So [facility] [phone].    ATENCIÓN: Si habla español, tiene a cheek disposición servicios gratuitos de asistencia lingüística. Llame al [phone].    We comply with applicable federal civil rights laws and [place] laws. We do not discriminate on the basis of race, color, national origin, age, disability, sex, sexual orientation, or gender identity.            Thank you!     Thank you for choosing [facility]  for your care. Our goal is always to provide you with excellent care. Hearing back from our patients is one way we can " continue to improve our services. Please take a few minutes to complete the written survey that you may receive in the mail after your visit with us. Thank you!             Your Updated Medication List - Protect others around you: Learn how to safely use, store and throw away your medicines at www.disposemymeds.org.          This list is accurate as of: 11/2/17 11:59 PM.  Always use your most recent med list.                   Brand Name Dispense Instructions for use Diagnosis    omeprazole 20 MG tablet     90 tablet    Take 1 tablet (20 mg) by mouth daily Take 30-60 minutes before a meal.    Gastroesophageal reflux disease without esophagitis       prenatal multivitamin plus iron 27-0.8 MG Tabs per tablet     100 tablet    Take 1 tablet by mouth daily    Pregnancy test positive

## 2017-11-02 NOTE — PROGRESS NOTES
Patient still has constipation, tried Metamucil as recommended, but made her sick. Needs an alternative.  Injectable Influenza Immunization Documentation    1.  Is the person to be vaccinated sick today?   No    2. Does the person to be vaccinated have an allergy to a component   of the vaccine?   No  Egg Allergy Algorithm Link    3. Has the person to be vaccinated ever had a serious reaction   to influenza vaccine in the past?  N/A    4. Has the person to be vaccinated ever had Guillain-Barré syndrome?   No    Form completed by Sujata Gates CMA

## 2017-11-02 NOTE — PROGRESS NOTES
No loss of fluid/vaginal bleeding/regular contractions. + FM  Did not tolerate the metamucil-didn't like taste and consistency. Has used prune juice in past and worked well.   Having some pubic bone pains.  -Rec Flu shot  -DIscussed prenatal yoga and stretching  -Prunes/juice for constipation.  -Discussed birthing classes and hosp tour.  - Labor precautions. F/U 2wk for 28wk labs.     Amita Romeo Masters, DO

## 2017-11-16 ENCOUNTER — PRENATAL OFFICE VISIT (OUTPATIENT)
Dept: OBGYN | Facility: CLINIC | Age: 27
End: 2017-11-16
Payer: COMMERCIAL

## 2017-11-16 VITALS — BODY MASS INDEX: 22.44 KG/M2 | WEIGHT: 139 LBS | SYSTOLIC BLOOD PRESSURE: 116 MMHG | DIASTOLIC BLOOD PRESSURE: 60 MMHG

## 2017-11-16 DIAGNOSIS — K21.9 GASTROESOPHAGEAL REFLUX DISEASE WITHOUT ESOPHAGITIS: ICD-10-CM

## 2017-11-16 DIAGNOSIS — O99.613 CONSTIPATION DURING PREGNANCY IN THIRD TRIMESTER: Primary | ICD-10-CM

## 2017-11-16 DIAGNOSIS — Z23 NEED FOR TDAP VACCINATION: ICD-10-CM

## 2017-11-16 DIAGNOSIS — Z3A.28 28 WEEKS GESTATION OF PREGNANCY: ICD-10-CM

## 2017-11-16 DIAGNOSIS — K59.00 CONSTIPATION DURING PREGNANCY IN THIRD TRIMESTER: Primary | ICD-10-CM

## 2017-11-16 DIAGNOSIS — Z34.03 ENCOUNTER FOR SUPERVISION OF NORMAL FIRST PREGNANCY IN THIRD TRIMESTER: Primary | ICD-10-CM

## 2017-11-16 LAB
GLUCOSE 1H P 50 G GLC PO SERPL-MCNC: 117 MG/DL (ref 60–129)
HGB BLD-MCNC: 13.2 G/DL (ref 11.7–15.7)

## 2017-11-16 PROCEDURE — 82950 GLUCOSE TEST: CPT | Performed by: OBSTETRICS & GYNECOLOGY

## 2017-11-16 PROCEDURE — 36415 COLL VENOUS BLD VENIPUNCTURE: CPT | Performed by: OBSTETRICS & GYNECOLOGY

## 2017-11-16 PROCEDURE — 00000218 ZZHCL STATISTIC OBHBG - HEMOGLOBIN: Performed by: OBSTETRICS & GYNECOLOGY

## 2017-11-16 PROCEDURE — 99207 ZZC PRENATAL VISIT: CPT | Performed by: OBSTETRICS & GYNECOLOGY

## 2017-11-16 PROCEDURE — 90471 IMMUNIZATION ADMIN: CPT

## 2017-11-16 PROCEDURE — 90715 TDAP VACCINE 7 YRS/> IM: CPT

## 2017-11-16 RX ORDER — POLYETHYLENE GLYCOL 3350 17 G/17G
1 POWDER, FOR SOLUTION ORAL DAILY
Qty: 510 G | Refills: 1 | Status: SHIPPED | OUTPATIENT
Start: 2017-11-16 | End: 2018-04-02

## 2017-11-16 NOTE — PROGRESS NOTES
Syphilis is a sexually transmitted disease that can cause birth defects in the babies of untreated mothers. Every pregnant patient is tested for syphilis early in each pregnancy as part of the routine lab work. The [place] Department of [facility] has seen an increase in the rate of syphilis in [place]. The [facility] now recommends testing for syphilis 3 times during a pregnancy, the new prenatal visit, 28 weeks and when admitted for delivery. Patient declines lab testing for syphilis.

## 2017-11-16 NOTE — MR AVS SNAPSHOT
"              After Visit Summary   11/16/2017    Reji Snyder    MRN: [id number]           Patient Information     Date Of Birth          1990        Visit Information        Provider Department      11/16/2017 10:20 AM Amita Lundberg DO [facility]        Today's Diagnoses     Constipation during pregnancy in third trimester    -  1    Gastroesophageal reflux disease without esophagitis        28 weeks gestation of pregnancy           Follow-ups after your visit        Follow-up notes from your care team     Return in about 4 weeks (around 12/14/2017).      Your next 10 appointments already scheduled     Dec 14, 2017  9:50 AM CST   ESTABLISHED PRENATAL with Amita Lundberg DO   [facility] ([facility]    [address] [place] [address] [phone]              Who to contact     If you have questions or need follow up information about today's clinic visit or your schedule please contact [facility] directly at [phone].  Normal or non-critical lab and imaging results will be communicated to you by WorldStatehart, letter or phone within 4 business days after the clinic has received the results. If you do not hear from us within 7 days, please contact the clinic through Motilot or phone. If you have a critical or abnormal lab result, we will notify you by phone as soon as possible.  Submit refill requests through PromisePay or call your pharmacy and they will forward the refill request to us. Please allow 3 business days for your refill to be completed.          Additional Information About Your Visit        WorldStatehart Information     PromisePay lets you send messages to your doctor, view your test results, renew your prescriptions, schedule appointments and more. To sign up, go to [email or web address] . Click on \"Log in\" on the left side of the screen, which will take you to the Welcome page. " "Then click on \"Sign up Now\" on the right side of the page.     You will be asked to enter the access code listed below, as well as some personal information. Please follow the directions to create your username and password.     Your access code is: [email or web address]  Expires: 2017 10:13 AM     Your access code will  in 90 days. If you need help or a new code, please call your [facility] clinic or [phone].        Care EveryWhere ID     This is your Care EveryWhere ID. This could be used by other organizations to access your [facility] medical records  [id number]        Your Vitals Were     Last Period Breastfeeding? BMI (Body Mass Index)             2017 No 22.44 kg/m2          Blood Pressure from Last 3 Encounters:   17 116/60   17 94/54   17 106/59    Weight from Last 3 Encounters:   17 139 lb (63 kg)   17 137 lb (62.1 kg)   17 134 lb (60.8 kg)              Today, you had the following     No orders found for display         Today's Medication Changes          These changes are accurate as of: 17 11:18 AM.  If you have any questions, ask your nurse or doctor.               Start taking these medicines.        Dose/Directions    polyethylene glycol powder   Commonly known as:  MIRALAX   Used for:  Constipation during pregnancy in third trimester   Started by:  Amita Lundberg, DO        Dose:  1 capful   Take 17 g (1 capful) by mouth daily   Quantity:  510 g   Refills:  1       ranitidine 75 MG tablet   Commonly known as:  ZANTAC   Used for:  Gastroesophageal reflux disease without esophagitis, 28 weeks gestation of pregnancy   Started by:  Amita Lundberg, DO        Dose:  150 mg   Take 2 tablets (150 mg) by mouth 2 times daily   Quantity:  60 tablet   Refills:  6            Where to get your medicines      These medications were sent to [id number] - [place], [place] - [address] AT [facility] OF [address]  " [address] [place] [address]     Phone:  [phone]     polyethylene glycol powder    ranitidine 75 MG tablet                Primary Care Provider    DEJAN Ag CNP       No address on file        Equal Access to Services     BILL OTERO : Hadii aad ku hadrubeno Sowendyali, waaxda luqadaha, qaybta kaalmada adeegyada, waxlily idiin carolen janettemecca ramsey christopher tripathi. So [facility] [phone].    ATENCIÓN: Si habla español, tiene a cheek disposición servicios gratuitos de asistencia lingüística. Llame al [phone].    We comply with applicable federal civil rights laws and [place] laws. We do not discriminate on the basis of race, color, national origin, age, disability, sex, sexual orientation, or gender identity.            Thank you!     Thank you for choosing [facility]  for your care. Our goal is always to provide you with excellent care. Hearing back from our patients is one way we can continue to improve our services. Please take a few minutes to complete the written survey that you may receive in the mail after your visit with us. Thank you!             Your Updated Medication List - Protect others around you: Learn how to safely use, store and throw away your medicines at www.disposemymeds.org.          This list is accurate as of: 11/16/17 11:18 AM.  Always use your most recent med list.                   Brand Name Dispense Instructions for use Diagnosis    omeprazole 20 MG tablet     90 tablet    Take 1 tablet (20 mg) by mouth daily Take 30-60 minutes before a meal.    Gastroesophageal reflux disease without esophagitis       polyethylene glycol powder    MIRALAX    510 g    Take 17 g (1 capful) by mouth daily    Constipation during pregnancy in third trimester       prenatal multivitamin plus iron 27-0.8 MG Tabs per tablet     100 tablet    Take 1 tablet by mouth daily    Pregnancy test positive       ranitidine 75 MG tablet    ZANTAC    60 tablet    Take 2 tablets (150  mg) by mouth 2 times daily    Gastroesophageal reflux disease without esophagitis, 28 weeks gestation of pregnancy

## 2017-11-16 NOTE — PROGRESS NOTES
No loss of fluid/vaginal bleeding/regular contractions. + FM  Tried drinking prune juice but cannot tolerate it. Has tried metamucil and it didn't work.   Having reflux too, not using anything.   Not drinking much water.  -28wk labs, tdap today.  Has had Flu vacc.  -Constipation  -GERD  - Labor precautions. F/U 2wk     Amita Romeo Masters, DO

## 2017-12-08 ENCOUNTER — PRENATAL OFFICE VISIT (OUTPATIENT)
Dept: OBGYN | Facility: CLINIC | Age: 27
End: 2017-12-08
Payer: COMMERCIAL

## 2017-12-08 ENCOUNTER — TELEPHONE (OUTPATIENT)
Dept: NURSING | Facility: CLINIC | Age: 27
End: 2017-12-08

## 2017-12-08 VITALS — DIASTOLIC BLOOD PRESSURE: 56 MMHG | SYSTOLIC BLOOD PRESSURE: 104 MMHG | BODY MASS INDEX: 22.76 KG/M2 | WEIGHT: 141 LBS

## 2017-12-08 DIAGNOSIS — Z3A.31 31 WEEKS GESTATION OF PREGNANCY: ICD-10-CM

## 2017-12-08 DIAGNOSIS — O12.03 LEG SWELLING IN PREGNANCY IN THIRD TRIMESTER: Primary | ICD-10-CM

## 2017-12-08 DIAGNOSIS — Z34.03 ENCOUNTER FOR SUPERVISION OF NORMAL FIRST PREGNANCY IN THIRD TRIMESTER: ICD-10-CM

## 2017-12-08 PROCEDURE — 99207 ZZC PRENATAL VISIT: CPT | Performed by: OBSTETRICS & GYNECOLOGY

## 2017-12-08 NOTE — MR AVS SNAPSHOT
"              After Visit Summary   12/8/2017    Reji Snyder    MRN: [id number]           Patient Information     Date Of Birth          1990        Visit Information        Provider Department      12/8/2017 10:20 AM Amita Lundberg DO [facility]        Today's Diagnoses     Leg swelling in pregnancy in third trimester    -  1    Encounter for supervision of normal first pregnancy in third trimester        31 weeks gestation of pregnancy           Follow-ups after your visit        Your next 10 appointments already scheduled     Dec 22, 2017 10:40 AM CST   ESTABLISHED PRENATAL with Amita Lundberg DO   [facility] ([facility])    [address] [id number] [phone]              Who to contact     If you have questions or need follow up information about today's clinic visit or your schedule please contact [facility] [place] directly at [phone].  Normal or non-critical lab and imaging results will be communicated to you by Verdiemhart, letter or phone within 4 business days after the clinic has received the results. If you do not hear from us within 7 days, please contact the clinic through Verdiemhart or phone. If you have a critical or abnormal lab result, we will notify you by phone as soon as possible.  Submit refill requests through Nugg-it or call your pharmacy and they will forward the refill request to us. Please allow 3 business days for your refill to be completed.          Additional Information About Your Visit        VerdiemharFirmafon Information     Nugg-it lets you send messages to your doctor, view your test results, renew your prescriptions, schedule appointments and more. To sign up, go to [email or web address] . Click on \"Log in\" on the left side of the screen, which will take you to the Welcome page. Then click on \"Sign up Now\" on the right side of the page.     You will be " asked to enter the access code listed below, as well as some personal information. Please follow the directions to create your username and password.     Your access code is: [email or web address]  Expires: 2017 10:13 AM     Your access code will  in 90 days. If you need help or a new code, please call your [facility] clinic or [phone].        Care EveryWhere ID     This is your Care EveryWhere ID. This could be used by other organizations to access your [facility] medical records  [id number]        Your Vitals Were     Last Period BMI (Body Mass Index)                2017 22.76 kg/m2           Blood Pressure from Last 3 Encounters:   17 104/56   17 116/60   17 94/54    Weight from Last 3 Encounters:   17 141 lb (64 kg)   17 139 lb (63 kg)   17 137 lb (62.1 kg)              Today, you had the following     No orders found for display         Today's Medication Changes          These changes are accurate as of: 17 10:41 AM.  If you have any questions, ask your nurse or doctor.               Start taking these medicines.        Dose/Directions    order for DME   Used for:  Leg swelling in pregnancy in third trimester, 31 weeks gestation of pregnancy        Compression stockings, two pair, knee high Indication: LE swelling in pregnancy Wear during the day, remove at bedtime.   Quantity:  2 each   Refills:  0            Where to get your medicines      Some of these will need a paper prescription and others can be bought over the counter.  Ask your nurse if you have questions.     Bring a paper prescription for each of these medications     order for DME                Primary Care Provider Office Phone # Fax #    [facility] [phone]       [facility] [address] [facility] [address]  [facility] [address]        Equal Access to Services     BILL OTERO AH: coy Nguyễn,  nile jiang janetteerica de la fuente ah. So [facility] [phone].    ATENCIÓN: Si marquis lyle, tiene a cheek disposición servicios gratuitos de asistencia lingüística. Antonio al [phone].    We comply with applicable federal civil rights laws and [place] laws. We do not discriminate on the basis of race, color, national origin, age, disability, sex, sexual orientation, or gender identity.            Thank you!     Thank you for choosing [facility] [place]  for your care. Our goal is always to provide you with excellent care. Hearing back from our patients is one way we can continue to improve our services. Please take a few minutes to complete the written survey that you may receive in the mail after your visit with us. Thank you!             Your Updated Medication List - Protect others around you: Learn how to safely use, store and throw away your medicines at www.disposemymeds.org.          This list is accurate as of: 12/8/17 10:41 AM.  Always use your most recent med list.                   Brand Name Dispense Instructions for use Diagnosis    omeprazole 20 MG tablet     90 tablet    Take 1 tablet (20 mg) by mouth daily Take 30-60 minutes before a meal.    Gastroesophageal reflux disease without esophagitis       order for DME     2 each    Compression stockings, two pair, knee high Indication: LE swelling in pregnancy Wear during the day, remove at bedtime.    Leg swelling in pregnancy in third trimester, 31 weeks gestation of pregnancy       polyethylene glycol powder    MIRALAX    510 g    Take 17 g (1 capful) by mouth daily    Constipation during pregnancy in third trimester       prenatal multivitamin plus iron 27-0.8 MG Tabs per tablet     100 tablet    Take 1 tablet by mouth daily    Pregnancy test positive       ranitidine 75 MG tablet    ZANTAC    60 tablet    Take 2 tablets (150 mg) by mouth 2 times daily    Gastroesophageal reflux disease without  esophagitis, 28 weeks gestation of pregnancy

## 2017-12-08 NOTE — LETTER
[facility]  [address] [place] [address]  Phone: [phone]  Fax: [phone]    December 8, 2017        Reji Snyder  [address] [place] [address]          To whom it may concern:    RE: Reji Snyder    Patient was seen and treated today at our clinic. Her due date is 2/5/18. I recommend she be allowed to sit and rest for 15 minutes for every 4 hours of work. Additionally, it would be recommended for her to have a tall stool or tall chair to be able to sit in/lean upon while working. She has no limitation in mobility/movements otherwise.    Please contact me for questions or concerns.      Sincerely,        Amita Romeo Masters, DO

## 2017-12-08 NOTE — PROGRESS NOTES
No loss of fluid/vaginal bleeding/regular contractions. + FM  -Compression stockings for standing at work.  Work note to have ability to sit/lean while working. Discussed no medical indication to stop working.   - Labor precautions. F/U 2wk.     Amita Romeo Masters, DO

## 2017-12-08 NOTE — TELEPHONE ENCOUNTER
Ramirez from  home medical calling to clasrify strength for the  Compression stockings. Spoke with Dr Lundberg- 20 -30 should be the strentgh. Information given to Ramirez.

## 2017-12-22 ENCOUNTER — PRENATAL OFFICE VISIT (OUTPATIENT)
Dept: OBGYN | Facility: CLINIC | Age: 27
End: 2017-12-22
Payer: COMMERCIAL

## 2017-12-22 VITALS — SYSTOLIC BLOOD PRESSURE: 100 MMHG | BODY MASS INDEX: 22.27 KG/M2 | DIASTOLIC BLOOD PRESSURE: 64 MMHG | WEIGHT: 138 LBS

## 2017-12-22 DIAGNOSIS — Z34.03 ENCOUNTER FOR SUPERVISION OF NORMAL FIRST PREGNANCY IN THIRD TRIMESTER: ICD-10-CM

## 2017-12-22 PROCEDURE — 99207 ZZC PRENATAL VISIT: CPT | Performed by: OBSTETRICS & GYNECOLOGY

## 2017-12-22 NOTE — MR AVS SNAPSHOT
"              After Visit Summary   2017    Reji Snyder    MRN: [id number]           Patient Information     Date Of Birth          1990        Visit Information        Provider Department      2017 10:40 AM Amita Lundberg,  [facility]        Today's Diagnoses     Encounter for supervision of normal first pregnancy in third trimester           Follow-ups after your visit        Follow-up notes from your care team     Return in about 2 weeks (around 2018).      Who to contact     If you have questions or need follow up information about today's clinic visit or your schedule please contact [facility] directly at [phone].  Normal or non-critical lab and imaging results will be communicated to you by MyChart, letter or phone within 4 business days after the clinic has received the results. If you do not hear from us within 7 days, please contact the clinic through MyChart or phone. If you have a critical or abnormal lab result, we will notify you by phone as soon as possible.  Submit refill requests through Car Loan 4U or call your pharmacy and they will forward the refill request to us. Please allow 3 business days for your refill to be completed.          Additional Information About Your Visit        MyChart Information     Car Loan 4U lets you send messages to your doctor, view your test results, renew your prescriptions, schedule appointments and more. To sign up, go to [email or web address] . Click on \"Log in\" on the left side of the screen, which will take you to the Welcome page. Then click on \"Sign up Now\" on the right side of the page.     You will be asked to enter the access code listed below, as well as some personal information. Please follow the directions to create your username and password.     Your access code is: [email or web address]  Expires: 3/22/2018 10:58 AM     Your access code will  in 90 days. If you need help or a new code, " please call your [facility] clinic or [phone].        Care EveryWhere ID     This is your Care EveryWhere ID. This could be used by other organizations to access your [facility] medical records  [id number]        Your Vitals Were     Last Period BMI (Body Mass Index)                05/28/2017 22.27 kg/m2           Blood Pressure from Last 3 Encounters:   12/22/17 100/64   12/08/17 104/56   11/16/17 116/60    Weight from Last 3 Encounters:   12/22/17 138 lb (62.6 kg)   12/08/17 141 lb (64 kg)   11/16/17 139 lb (63 kg)              Today, you had the following     No orders found for display       Primary Care Provider Office Phone # Fax #    [facility] [phone]       [facility] [address] [facility] [address]  [facility] [address]        Equal Access to Services     BILL OTERO : Hadii aad ku hadasho Sowendyali, waaxda luqadaha, qaybta kaalmada adeegyada, erica avendañoin stanley white . So [facility] [phone].    ATENCIÓN: Si habla español, tiene a cheek disposición servicios gratuitos de asistencia lingüística. Antonio al [phone].    We comply with applicable federal civil rights laws and [place] laws. We do not discriminate on the basis of race, color, national origin, age, disability, sex, sexual orientation, or gender identity.            Thank you!     Thank you for choosing [facility]  for your care. Our goal is always to provide you with excellent care. Hearing back from our patients is one way we can continue to improve our services. Please take a few minutes to complete the written survey that you may receive in the mail after your visit with us. Thank you!             Your Updated Medication List - Protect others around you: Learn how to safely use, store and throw away your medicines at www.disposemymeds.org.          This list is accurate as of: 12/22/17 10:58 AM.  Always use your most recent med list.                    Brand Name Dispense Instructions for use Diagnosis    omeprazole 20 MG tablet     90 tablet    Take 1 tablet (20 mg) by mouth daily Take 30-60 minutes before a meal.    Gastroesophageal reflux disease without esophagitis       order for DME     2 each    Compression stockings, two pair, knee high Indication: LE swelling in pregnancy Wear during the day, remove at bedtime.    Leg swelling in pregnancy in third trimester, 31 weeks gestation of pregnancy       polyethylene glycol powder    MIRALAX    510 g    Take 17 g (1 capful) by mouth daily    Constipation during pregnancy in third trimester       prenatal multivitamin plus iron 27-0.8 MG Tabs per tablet     100 tablet    Take 1 tablet by mouth daily    Pregnancy test positive       ranitidine 75 MG tablet    ZANTAC    60 tablet    Take 2 tablets (150 mg) by mouth 2 times daily    Gastroesophageal reflux disease without esophagitis, 28 weeks gestation of pregnancy

## 2017-12-22 NOTE — PROGRESS NOTES
No loss of fluid/vaginal bleeding/regular contractions. + FM  Having some issues sleeping, getting comfortable. No issues with n/v. Eating just fine.   -Discussed increased snacking, or supplemental shakes.   - Labor precautions. F/U 2wk    Amita Romeo Masters, DO

## 2018-01-04 ENCOUNTER — PRENATAL OFFICE VISIT (OUTPATIENT)
Dept: OBGYN | Facility: CLINIC | Age: 28
End: 2018-01-04
Payer: COMMERCIAL

## 2018-01-04 VITALS — BODY MASS INDEX: 23.02 KG/M2 | DIASTOLIC BLOOD PRESSURE: 62 MMHG | WEIGHT: 142.6 LBS | SYSTOLIC BLOOD PRESSURE: 102 MMHG

## 2018-01-04 DIAGNOSIS — Z34.03 ENCOUNTER FOR SUPERVISION OF NORMAL FIRST PREGNANCY IN THIRD TRIMESTER: ICD-10-CM

## 2018-01-04 PROCEDURE — 99207 ZZC PRENATAL VISIT: CPT | Performed by: OBSTETRICS & GYNECOLOGY

## 2018-01-04 NOTE — PROGRESS NOTES
No loss of fluid/vaginal bleeding/regular contractions. + FM  -GBS next visit  - Labor precautions. F/U 2wk    Amita Romeo Masters, DO

## 2018-01-04 NOTE — MR AVS SNAPSHOT
"              After Visit Summary   1/4/2018    Reji Snyder    MRN: [id number]           Patient Information     Date Of Birth          1990        Visit Information        Provider Department      1/4/2018 10:50 AM Amita Lundberg DO [facility] [place]        Today's Diagnoses     Encounter for supervision of normal first pregnancy in third trimester           Follow-ups after your visit        Follow-up notes from your care team     Return in about 2 weeks (around 1/18/2018).      Your next 10 appointments already scheduled     Jan 18, 2018 11:00 AM CST   ESTABLISHED PRENATAL with Sudha Vick MD   [facility] ([facility])    [address] [place] [id number] [phone]              Who to contact     If you have questions or need follow up information about today's clinic visit or your schedule please contact [facility] directly at [phone].  Normal or non-critical lab and imaging results will be communicated to you by AndroJekhart, letter or phone within 4 business days after the clinic has received the results. If you do not hear from us within 7 days, please contact the clinic through Kuli Kulit or phone. If you have a critical or abnormal lab result, we will notify you by phone as soon as possible.  Submit refill requests through I & Combine or call your pharmacy and they will forward the refill request to us. Please allow 3 business days for your refill to be completed.          Additional Information About Your Visit        AndroJekhart Information     I & Combine lets you send messages to your doctor, view your test results, renew your prescriptions, schedule appointments and more. To sign up, go to [email or web address] . Click on \"Log in\" on the left side of the screen, which will take you to the Welcome page. Then click on \"Sign up Now\" on the right side of the page.     You will be asked " to enter the access code listed below, as well as some personal information. Please follow the directions to create your username and password.     Your access code is: [email or web address]  Expires: 3/22/2018 10:58 AM     Your access code will  in 90 days. If you need help or a new code, please call your [facility] clinic or [phone].        Care EveryWhere ID     This is your Care EveryWhere ID. This could be used by other organizations to access your [facility] medical records  [id number]        Your Vitals Were     Last Period BMI (Body Mass Index)                2017 23.02 kg/m2           Blood Pressure from Last 3 Encounters:   18 102/62   17 100/64   17 104/56    Weight from Last 3 Encounters:   18 142 lb 9.6 oz (64.7 kg)   17 138 lb (62.6 kg)   17 141 lb (64 kg)              Today, you had the following     No orders found for display       Primary Care Provider Office Phone # Fax #    [facility] [place] [facility] [phone]       [facility] [address] [place] [address] FERNANDO  [facility] [address]  [facility] [address]        Equal Access to Services     BILL OTERO : Hadii aad ku hadasho Soomaali, waaxda luqadaha, qaybta kaalmada adeegyada, waxay jarochoin hayjose white . So [facility] [phone].    ATENCIÓN: Si habla español, tiene a cheek disposición servicios gratuitos de asistencia lingüística. Llame al [phone].    We comply with applicable federal civil rights laws and [place] laws. We do not discriminate on the basis of race, color, national origin, age, disability, sex, sexual orientation, or gender identity.            Thank you!     Thank you for choosing [facility]  for your care. Our goal is always to provide you with excellent care. Hearing back from our patients is one way we can continue to improve our services. Please take a few minutes to complete the written survey that you may receive in the mail  after your visit with us. Thank you!             Your Updated Medication List - Protect others around you: Learn how to safely use, store and throw away your medicines at www.disposemymeds.org.          This list is accurate as of: 1/4/18 10:54 AM.  Always use your most recent med list.                   Brand Name Dispense Instructions for use Diagnosis    omeprazole 20 MG tablet     90 tablet    Take 1 tablet (20 mg) by mouth daily Take 30-60 minutes before a meal.    Gastroesophageal reflux disease without esophagitis       order for DME     2 each    Compression stockings, two pair, knee high Indication: LE swelling in pregnancy Wear during the day, remove at bedtime.    Leg swelling in pregnancy in third trimester, 31 weeks gestation of pregnancy       polyethylene glycol powder    MIRALAX    510 g    Take 17 g (1 capful) by mouth daily    Constipation during pregnancy in third trimester       prenatal multivitamin plus iron 27-0.8 MG Tabs per tablet     100 tablet    Take 1 tablet by mouth daily    Pregnancy test positive       ranitidine 75 MG tablet    ZANTAC    60 tablet    Take 2 tablets (150 mg) by mouth 2 times daily    Gastroesophageal reflux disease without esophagitis, 28 weeks gestation of pregnancy

## 2018-01-08 ENCOUNTER — TELEPHONE (OUTPATIENT)
Dept: OBGYN | Facility: CLINIC | Age: 28
End: 2018-01-08

## 2018-01-08 NOTE — TELEPHONE ENCOUNTER
Pt states that she was told her work ability form was faxed to her work but her work says they did not receive it.  Pt does not know the fax number but says it is in her paperwork    Please call back to discuss.

## 2018-01-18 ENCOUNTER — PRENATAL OFFICE VISIT (OUTPATIENT)
Dept: OBGYN | Facility: CLINIC | Age: 28
End: 2018-01-18
Payer: COMMERCIAL

## 2018-01-18 VITALS — DIASTOLIC BLOOD PRESSURE: 66 MMHG | BODY MASS INDEX: 22.44 KG/M2 | SYSTOLIC BLOOD PRESSURE: 100 MMHG | WEIGHT: 139 LBS

## 2018-01-18 DIAGNOSIS — Z34.03 ENCOUNTER FOR SUPERVISION OF NORMAL FIRST PREGNANCY IN THIRD TRIMESTER: Primary | ICD-10-CM

## 2018-01-18 DIAGNOSIS — Z36.85 SCREENING, ANTENATAL, FOR STREPTOCOCCUS B: ICD-10-CM

## 2018-01-18 PROCEDURE — 87653 STREP B DNA AMP PROBE: CPT | Performed by: OBSTETRICS & GYNECOLOGY

## 2018-01-18 PROCEDURE — 99207 ZZC PRENATAL VISIT: CPT | Performed by: OBSTETRICS & GYNECOLOGY

## 2018-01-18 NOTE — PROGRESS NOTES
Prenatal Visit  Doing well. Good fetal movement. No contractions.  SVE: closed/50/-2  GBS collected. Counseled about it  RTC in 1 week with Dr. Toño Vick MD

## 2018-01-18 NOTE — MR AVS SNAPSHOT
"              After Visit Summary   2018    Reji Snyder    MRN: [id number]           Patient Information     Date Of Birth          1990        Visit Information        Provider Department      2018 11:00 AM Sudha Vick MD [facility] [place]        Today's Diagnoses     Encounter for supervision of normal first pregnancy in third trimester    -  1    Screening, , for Streptococcus B           Follow-ups after your visit        Follow-up notes from your care team     Return in about 1 week (around 2018).      Your next 10 appointments already scheduled     2018  9:50 AM CST   ESTABLISHED PRENATAL with [facility]mita Romeo Masters[facility]    [address] [place] [id number] [phone]              Who to contact     If you have questions or need follow up information about today's clinic visit or your schedule please contact [facility] directly at [phone].  Normal or non-critical lab and imaging results will be communicated to you by Hostwayhart, letter or phone within 4 business days after the clinic has received the results. If you do not hear from us within 7 days, please contact the clinic through Hostwayhart or phone. If you have a critical or abnormal lab result, we will notify you by phone as soon as possible.  Submit refill requests through SprainGo or call your pharmacy and they will forward the refill request to us. Please allow 3 business days for your refill to be completed.          Additional Information About Your Visit        [email or web address] Information     SprainGo lets you send messages to your doctor, view your test results, renew your prescriptions, schedule appointments and more. To sign up, go to [email or web address] . Click on \"Log in\" on the left side of the screen, which will take you to the Welcome page. Then click on \"Sign up Now\" " on the right side of the page.     You will be asked to enter the access code listed below, as well as some personal information. Please follow the directions to create your username and password.     Your access code is: [email or web address]  Expires: 3/22/2018 10:58 AM     Your access code will  in 90 days. If you need help or a new code, please call your [facility] clinic or [phone].        Care EveryWhere ID     This is your Care EveryWhere ID. This could be used by other organizations to access your [facility] medical records  [id number]        Your Vitals Were     Last Period Breastfeeding? BMI (Body Mass Index)             2017 No 22.44 kg/m2          Blood Pressure from Last 3 Encounters:   18 100/66   18 102/62   17 100/64    Weight from Last 3 Encounters:   18 139 lb (63 kg)   18 142 lb 9.6 oz (64.7 kg)   17 138 lb (62.6 kg)              We Performed the Following     Group B strep PCR        Primary Care Provider Office Phone # Fax #    [facility] [place] [facility] [phone]       [place] [address]  [address] [address]        Equal Access to Services     BILL OTERO : Hadii aad ku hadasho Soomaali, waaxda luqadaha, qaybta kaalmada adeegyada, erica avendañoin haydesn mary jane white . So [facility] [phone].    ATENCIÓN: Si habla español, tiene a cheek disposición servicios gratuitos de asistencia lingüística. Antonio al [phone].    We comply with applicable federal civil rights laws and [place] laws. We do not discriminate on the basis of race, color, national origin, age, disability, sex, sexual orientation, or gender identity.            Thank you!     Thank you for choosing [facility]  for your care. Our goal is always to provide you with excellent care. Hearing back from our patients is one way we can continue to improve our services. Please take a few minutes to  complete the written survey that you may receive in the mail after your visit with us. Thank you!             Your Updated Medication List - Protect others around you: Learn how to safely use, store and throw away your medicines at www.disposemymeds.org.          This list is accurate as of: 1/18/18 11:15 AM.  Always use your most recent med list.                   Brand Name Dispense Instructions for use Diagnosis    order for DME     2 each    Compression stockings, two pair, knee high Indication: LE swelling in pregnancy Wear during the day, remove at bedtime.    Leg swelling in pregnancy in third trimester, 31 weeks gestation of pregnancy       polyethylene glycol powder    MIRALAX    510 g    Take 17 g (1 capful) by mouth daily    Constipation during pregnancy in third trimester       prenatal multivitamin plus iron 27-0.8 MG Tabs per tablet     100 tablet    Take 1 tablet by mouth daily    Pregnancy test positive

## 2018-01-19 LAB
GP B STREP DNA SPEC QL NAA+PROBE: NEGATIVE
SPECIMEN SOURCE: NORMAL

## 2018-01-25 ENCOUNTER — PRENATAL OFFICE VISIT (OUTPATIENT)
Dept: OBGYN | Facility: CLINIC | Age: 28
End: 2018-01-25
Payer: COMMERCIAL

## 2018-01-25 VITALS — SYSTOLIC BLOOD PRESSURE: 102 MMHG | WEIGHT: 143.6 LBS | BODY MASS INDEX: 23.18 KG/M2 | DIASTOLIC BLOOD PRESSURE: 64 MMHG

## 2018-01-25 DIAGNOSIS — Z34.03 ENCOUNTER FOR SUPERVISION OF NORMAL FIRST PREGNANCY IN THIRD TRIMESTER: ICD-10-CM

## 2018-01-25 PROCEDURE — 99207 ZZC PRENATAL VISIT: CPT | Performed by: OBSTETRICS & GYNECOLOGY

## 2018-01-25 NOTE — MR AVS SNAPSHOT
After Visit Summary   1/25/2018    Reji Snyder    MRN: [id number]           Patient Information     Date Of Birth          1990        Visit Information        Provider Department      1/25/2018 9:50 AM Amita Lundberg DO [facility] [place]        Today's Diagnoses     Encounter for supervision of normal first pregnancy in third trimester           Follow-ups after your visit        Follow-up notes from your care team     Return in about 1 week (around 2/1/2018).      Your next 10 appointments already scheduled     Feb 01, 2018 10:20 AM CST   ESTABLISHED PRENATAL with Amita Lundberg DO   [facility] [place] ([facility])    [address] [place] [id number] [phone]              Who to contact     If you have questions or need follow up information about today's clinic visit or your schedule please contact [facility] directly at [phone].  Normal or non-critical lab and imaging results will be communicated to you by General Atomicshart, letter or phone within 4 business days after the clinic has received the results. If you do not hear from us within 7 days, please contact the clinic through AdsWizzt or phone. If you have a critical or abnormal lab result, we will notify you by phone as soon as possible.  Submit refill requests through S[employer] Analytics or call your pharmacy and they will forward the refill request to us. Please allow 3 business days for your refill to be completed.          Additional Information About Your Visit        MyChart Information     [employer] gives you secure access to your electronic health record. If you see a primary care provider, you can also send messages to your care team and make appointments. If you have questions, please call your primary care clinic.  If you do not have a primary care provider, please call [phone] and they will assist you.        Care EveryWhere ID      This is your Care EveryWhere ID. This could be used by other organizations to access your [facility] medical records  [id number]        Your Vitals Were     Last Period BMI (Body Mass Index)                05/28/2017 23.18 kg/m2           Blood Pressure from Last 3 Encounters:   01/25/18 102/64   01/18/18 100/66   01/04/18 102/62    Weight from Last 3 Encounters:   01/25/18 143 lb 9.6 oz (65.1 kg)   01/18/18 139 lb (63 kg)   01/04/18 142 lb 9.6 oz (64.7 kg)              Today, you had the following     No orders found for display       Primary Care Provider Office Phone # Fax #    [facility] [place] [phone]       [place] [address] [address] [facility] [address]  [facility] [address]        Equal Access to Services     BILL OTERO : Hadii aad ku hadasho Sorocio, waaxda luqadaha, qaybta kaalmada ademeccayada, erica white . So [facility] [phone].    ATENCIÓN: Si habla español, tiene a cheek disposición servicios gratuitos de asistencia lingüística. Antonio al [phone].    We comply with applicable federal civil rights laws and [place] laws. We do not discriminate on the basis of race, color, national origin, age, disability, sex, sexual orientation, or gender identity.            Thank you!     Thank you for choosing [facility]  for your care. Our goal is always to provide you with excellent care. Hearing back from our patients is one way we can continue to improve our services. Please take a few minutes to complete the written survey that you may receive in the mail after your visit with us. Thank you!             Your Updated Medication List - Protect others around you: Learn how to safely use, store and throw away your medicines at www.disposemymeds.org.          This list is accurate as of 1/25/18 10:31 AM.  Always use your most recent med list.                   Brand Name Dispense Instructions for use Diagnosis     order for DME     2 each    Compression stockings, two pair, knee high Indication: LE swelling in pregnancy Wear during the day, remove at bedtime.    Leg swelling in pregnancy in third trimester, 31 weeks gestation of pregnancy       polyethylene glycol powder    MIRALAX    510 g    Take 17 g (1 capful) by mouth daily    Constipation during pregnancy in third trimester       prenatal multivitamin plus iron 27-0.8 MG Tabs per tablet     100 tablet    Take 1 tablet by mouth daily    Pregnancy test positive

## 2018-01-25 NOTE — PROGRESS NOTES
No loss of fluid/vaginal bleeding/regular contractions. + FM  -GBS neg  -Labor precautions. F/U 1wk    Amita Romeo Masters, DO

## 2018-01-27 ENCOUNTER — NURSE TRIAGE (OUTPATIENT)
Dept: NURSING | Facility: CLINIC | Age: 28
End: 2018-01-27

## 2018-01-28 ENCOUNTER — ANESTHESIA EVENT (OUTPATIENT)
Dept: OBGYN | Facility: CLINIC | Age: 28
End: 2018-01-28
Payer: COMMERCIAL

## 2018-01-28 ENCOUNTER — HOSPITAL ENCOUNTER (INPATIENT)
Facility: CLINIC | Age: 28
LOS: 2 days | Discharge: HOME | End: 2018-01-30
Attending: OBSTETRICS & GYNECOLOGY | Admitting: OBSTETRICS & GYNECOLOGY
Payer: COMMERCIAL

## 2018-01-28 ENCOUNTER — ANESTHESIA (OUTPATIENT)
Dept: OBGYN | Facility: CLINIC | Age: 28
End: 2018-01-28
Payer: COMMERCIAL

## 2018-01-28 DIAGNOSIS — Z87.59 STATUS POST VACUUM-ASSISTED VAGINAL DELIVERY: ICD-10-CM

## 2018-01-28 DIAGNOSIS — N94.89 LABIAL PAIN: ICD-10-CM

## 2018-01-28 PROBLEM — Z34.90 PREGNANCY: Status: ACTIVE | Noted: 2018-01-28

## 2018-01-28 LAB
ABO + RH BLD: NORMAL
ABO + RH BLD: NORMAL
BASOPHILS # BLD AUTO: 0 10E9/L (ref 0–0.2)
BASOPHILS NFR BLD AUTO: 0.1 %
DIFFERENTIAL METHOD BLD: NORMAL
EOSINOPHIL # BLD AUTO: 0 10E9/L (ref 0–0.7)
EOSINOPHIL NFR BLD AUTO: 0.1 %
ERYTHROCYTE [DISTWIDTH] IN BLOOD BY AUTOMATED COUNT: 13.8 % (ref 10–15)
HCT VFR BLD AUTO: 41.4 % (ref 35–47)
HGB BLD-MCNC: 14.3 G/DL (ref 11.7–15.7)
IMM GRANULOCYTES # BLD: 0 10E9/L (ref 0–0.4)
IMM GRANULOCYTES NFR BLD: 0.5 %
LYMPHOCYTES # BLD AUTO: 1.4 10E9/L (ref 0.8–5.3)
LYMPHOCYTES NFR BLD AUTO: 17.9 %
MCH RBC QN AUTO: 31 PG (ref 26.5–33)
MCHC RBC AUTO-ENTMCNC: 34.5 G/DL (ref 31.5–36.5)
MCV RBC AUTO: 90 FL (ref 78–100)
MONOCYTES # BLD AUTO: 0.5 10E9/L (ref 0–1.3)
MONOCYTES NFR BLD AUTO: 5.9 %
NEUTROPHILS # BLD AUTO: 6 10E9/L (ref 1.6–8.3)
NEUTROPHILS NFR BLD AUTO: 75.5 %
NRBC # BLD AUTO: 0 10*3/UL
NRBC BLD AUTO-RTO: 0 /100
PLATELET # BLD AUTO: 195 10E9/L (ref 150–450)
RBC # BLD AUTO: 4.62 10E12/L (ref 3.8–5.2)
SPECIMEN EXP DATE BLD: NORMAL
T PALLIDUM IGG+IGM SER QL: NEGATIVE
WBC # BLD AUTO: 8 10E9/L (ref 4–11)

## 2018-01-28 PROCEDURE — 25000125 ZZHC RX 250: Performed by: OBSTETRICS & GYNECOLOGY

## 2018-01-28 PROCEDURE — 3E0R3BZ INTRODUCTION OF ANESTHETIC AGENT INTO SPINAL CANAL, PERCUTANEOUS APPROACH: ICD-10-PCS | Performed by: ANESTHESIOLOGY

## 2018-01-28 PROCEDURE — 25000125 ZZHC RX 250: Performed by: ANESTHESIOLOGY

## 2018-01-28 PROCEDURE — 25000128 H RX IP 250 OP 636: Performed by: ANESTHESIOLOGY

## 2018-01-28 PROCEDURE — 86900 BLOOD TYPING SEROLOGIC ABO: CPT | Performed by: OBSTETRICS & GYNECOLOGY

## 2018-01-28 PROCEDURE — 72200001 ZZH LABOR CARE VAGINAL DELIVERY SINGLE

## 2018-01-28 PROCEDURE — 12000037 ZZH R&B POSTPARTUM INTERMEDIATE

## 2018-01-28 PROCEDURE — 25000132 ZZH RX MED GY IP 250 OP 250 PS 637: Performed by: OBSTETRICS & GYNECOLOGY

## 2018-01-28 PROCEDURE — 27110038 ZZH RX 271: Performed by: ANESTHESIOLOGY

## 2018-01-28 PROCEDURE — 86780 TREPONEMA PALLIDUM: CPT | Performed by: OBSTETRICS & GYNECOLOGY

## 2018-01-28 PROCEDURE — 86901 BLOOD TYPING SEROLOGIC RH(D): CPT | Performed by: OBSTETRICS & GYNECOLOGY

## 2018-01-28 PROCEDURE — 00HU33Z INSERTION OF INFUSION DEVICE INTO SPINAL CANAL, PERCUTANEOUS APPROACH: ICD-10-PCS | Performed by: ANESTHESIOLOGY

## 2018-01-28 PROCEDURE — 59025 FETAL NON-STRESS TEST: CPT

## 2018-01-28 PROCEDURE — G0463 HOSPITAL OUTPT CLINIC VISIT: HCPCS | Mod: 25

## 2018-01-28 PROCEDURE — 36415 COLL VENOUS BLD VENIPUNCTURE: CPT | Performed by: OBSTETRICS & GYNECOLOGY

## 2018-01-28 PROCEDURE — 0KQM0ZZ REPAIR PERINEUM MUSCLE, OPEN APPROACH: ICD-10-PCS | Performed by: OBSTETRICS & GYNECOLOGY

## 2018-01-28 PROCEDURE — 25000128 H RX IP 250 OP 636: Performed by: OBSTETRICS & GYNECOLOGY

## 2018-01-28 PROCEDURE — 85025 COMPLETE CBC W/AUTO DIFF WBC: CPT | Performed by: OBSTETRICS & GYNECOLOGY

## 2018-01-28 PROCEDURE — 0UQMXZZ REPAIR VULVA, EXTERNAL APPROACH: ICD-10-PCS | Performed by: OBSTETRICS & GYNECOLOGY

## 2018-01-28 PROCEDURE — 10907ZC DRAINAGE OF AMNIOTIC FLUID, THERAPEUTIC FROM PRODUCTS OF CONCEPTION, VIA NATURAL OR ARTIFICIAL OPENING: ICD-10-PCS | Performed by: OBSTETRICS & GYNECOLOGY

## 2018-01-28 PROCEDURE — 37000011 ZZH ANESTHESIA WARD SERVICE

## 2018-01-28 PROCEDURE — 59400 OBSTETRICAL CARE: CPT | Performed by: OBSTETRICS & GYNECOLOGY

## 2018-01-28 RX ORDER — FENTANYL CITRATE 50 UG/ML
50-100 INJECTION, SOLUTION INTRAMUSCULAR; INTRAVENOUS
Status: DISCONTINUED | OUTPATIENT
Start: 2018-01-28 | End: 2018-01-28

## 2018-01-28 RX ORDER — OXYTOCIN 10 [USP'U]/ML
10 INJECTION, SOLUTION INTRAMUSCULAR; INTRAVENOUS
Status: DISCONTINUED | OUTPATIENT
Start: 2018-01-28 | End: 2018-01-28

## 2018-01-28 RX ORDER — NALOXONE HYDROCHLORIDE 0.4 MG/ML
.1-.4 INJECTION, SOLUTION INTRAMUSCULAR; INTRAVENOUS; SUBCUTANEOUS
Status: DISCONTINUED | OUTPATIENT
Start: 2018-01-28 | End: 2018-01-30 | Stop reason: HOSPADM

## 2018-01-28 RX ORDER — LIDOCAINE 40 MG/G
CREAM TOPICAL
Status: DISCONTINUED | OUTPATIENT
Start: 2018-01-28 | End: 2018-01-28

## 2018-01-28 RX ORDER — IBUPROFEN 400 MG/1
800 TABLET, FILM COATED ORAL EVERY 6 HOURS PRN
Status: DISCONTINUED | OUTPATIENT
Start: 2018-01-28 | End: 2018-01-30 | Stop reason: HOSPADM

## 2018-01-28 RX ORDER — ONDANSETRON 2 MG/ML
4 INJECTION INTRAMUSCULAR; INTRAVENOUS EVERY 6 HOURS PRN
Status: DISCONTINUED | OUTPATIENT
Start: 2018-01-28 | End: 2018-01-28

## 2018-01-28 RX ORDER — NALBUPHINE HYDROCHLORIDE 10 MG/ML
2.5-5 INJECTION, SOLUTION INTRAMUSCULAR; INTRAVENOUS; SUBCUTANEOUS EVERY 6 HOURS PRN
Status: DISCONTINUED | OUTPATIENT
Start: 2018-01-28 | End: 2018-01-28

## 2018-01-28 RX ORDER — LIDOCAINE 40 MG/G
CREAM TOPICAL
Status: DISCONTINUED | OUTPATIENT
Start: 2018-01-28 | End: 2018-01-30 | Stop reason: HOSPADM

## 2018-01-28 RX ORDER — LIDOCAINE HYDROCHLORIDE AND EPINEPHRINE 15; 5 MG/ML; UG/ML
INJECTION, SOLUTION EPIDURAL PRN
Status: DISCONTINUED | OUTPATIENT
Start: 2018-01-28 | End: 2018-12-11 | Stop reason: HOSPADM

## 2018-01-28 RX ORDER — ACETAMINOPHEN 325 MG/1
650 TABLET ORAL EVERY 4 HOURS PRN
Status: DISCONTINUED | OUTPATIENT
Start: 2018-01-28 | End: 2018-01-28

## 2018-01-28 RX ORDER — OXYTOCIN/0.9 % SODIUM CHLORIDE 30/500 ML
340 PLASTIC BAG, INJECTION (ML) INTRAVENOUS CONTINUOUS PRN
Status: DISCONTINUED | OUTPATIENT
Start: 2018-01-28 | End: 2018-01-30 | Stop reason: HOSPADM

## 2018-01-28 RX ORDER — OXYTOCIN/0.9 % SODIUM CHLORIDE 30/500 ML
100 PLASTIC BAG, INJECTION (ML) INTRAVENOUS CONTINUOUS
Status: DISCONTINUED | OUTPATIENT
Start: 2018-01-28 | End: 2018-01-30 | Stop reason: HOSPADM

## 2018-01-28 RX ORDER — NALOXONE HYDROCHLORIDE 0.4 MG/ML
.1-.4 INJECTION, SOLUTION INTRAMUSCULAR; INTRAVENOUS; SUBCUTANEOUS
Status: DISCONTINUED | OUTPATIENT
Start: 2018-01-28 | End: 2018-01-28

## 2018-01-28 RX ORDER — BISACODYL 10 MG
10 SUPPOSITORY, RECTAL RECTAL DAILY PRN
Status: DISCONTINUED | OUTPATIENT
Start: 2018-01-30 | End: 2018-01-30 | Stop reason: HOSPADM

## 2018-01-28 RX ORDER — OXYCODONE HYDROCHLORIDE 5 MG/1
5 TABLET ORAL EVERY 4 HOURS PRN
Status: DISCONTINUED | OUTPATIENT
Start: 2018-01-28 | End: 2018-01-30 | Stop reason: HOSPADM

## 2018-01-28 RX ORDER — LANOLIN 100 %
OINTMENT (GRAM) TOPICAL
Status: DISCONTINUED | OUTPATIENT
Start: 2018-01-28 | End: 2018-01-30 | Stop reason: HOSPADM

## 2018-01-28 RX ORDER — OXYTOCIN/0.9 % SODIUM CHLORIDE 30/500 ML
1-24 PLASTIC BAG, INJECTION (ML) INTRAVENOUS CONTINUOUS
Status: DISCONTINUED | OUTPATIENT
Start: 2018-01-28 | End: 2018-01-28

## 2018-01-28 RX ORDER — HYDROMORPHONE HYDROCHLORIDE 1 MG/ML
0.3 INJECTION, SOLUTION INTRAMUSCULAR; INTRAVENOUS; SUBCUTANEOUS
Status: DISCONTINUED | OUTPATIENT
Start: 2018-01-28 | End: 2018-01-30 | Stop reason: HOSPADM

## 2018-01-28 RX ORDER — CARBOPROST TROMETHAMINE 250 UG/ML
250 INJECTION, SOLUTION INTRAMUSCULAR
Status: DISCONTINUED | OUTPATIENT
Start: 2018-01-28 | End: 2018-01-28

## 2018-01-28 RX ORDER — HYDROCORTISONE 2.5 %
CREAM (GRAM) TOPICAL 3 TIMES DAILY PRN
Status: DISCONTINUED | OUTPATIENT
Start: 2018-01-28 | End: 2018-01-30 | Stop reason: HOSPADM

## 2018-01-28 RX ORDER — MISOPROSTOL 200 UG/1
400 TABLET ORAL
Status: DISCONTINUED | OUTPATIENT
Start: 2018-01-28 | End: 2018-01-30 | Stop reason: HOSPADM

## 2018-01-28 RX ORDER — OXYCODONE AND ACETAMINOPHEN 5; 325 MG/1; MG/1
1 TABLET ORAL
Status: DISCONTINUED | OUTPATIENT
Start: 2018-01-28 | End: 2018-01-28

## 2018-01-28 RX ORDER — PRENATAL VIT/IRON FUM/FOLIC AC 27MG-0.8MG
1 TABLET ORAL DAILY
Status: DISCONTINUED | OUTPATIENT
Start: 2018-01-28 | End: 2018-01-30 | Stop reason: HOSPADM

## 2018-01-28 RX ORDER — METHYLERGONOVINE MALEATE 0.2 MG/ML
200 INJECTION INTRAVENOUS
Status: DISCONTINUED | OUTPATIENT
Start: 2018-01-28 | End: 2018-01-28

## 2018-01-28 RX ORDER — EPHEDRINE SULFATE 50 MG/ML
5 INJECTION, SOLUTION INTRAMUSCULAR; INTRAVENOUS; SUBCUTANEOUS
Status: DISCONTINUED | OUTPATIENT
Start: 2018-01-28 | End: 2018-01-28

## 2018-01-28 RX ORDER — AMOXICILLIN 250 MG
1 CAPSULE ORAL 2 TIMES DAILY PRN
Status: DISCONTINUED | OUTPATIENT
Start: 2018-01-28 | End: 2018-01-30 | Stop reason: HOSPADM

## 2018-01-28 RX ORDER — ACETAMINOPHEN 325 MG/1
650 TABLET ORAL EVERY 4 HOURS PRN
Status: DISCONTINUED | OUTPATIENT
Start: 2018-01-28 | End: 2018-01-30 | Stop reason: HOSPADM

## 2018-01-28 RX ORDER — OXYTOCIN 10 [USP'U]/ML
10 INJECTION, SOLUTION INTRAMUSCULAR; INTRAVENOUS
Status: DISCONTINUED | OUTPATIENT
Start: 2018-01-28 | End: 2018-01-30 | Stop reason: HOSPADM

## 2018-01-28 RX ORDER — OXYTOCIN/0.9 % SODIUM CHLORIDE 30/500 ML
100-340 PLASTIC BAG, INJECTION (ML) INTRAVENOUS CONTINUOUS PRN
Status: COMPLETED | OUTPATIENT
Start: 2018-01-28 | End: 2018-01-28

## 2018-01-28 RX ORDER — IBUPROFEN 400 MG/1
800 TABLET, FILM COATED ORAL
Status: DISCONTINUED | OUTPATIENT
Start: 2018-01-28 | End: 2018-01-28

## 2018-01-28 RX ORDER — AMOXICILLIN 250 MG
2 CAPSULE ORAL 2 TIMES DAILY PRN
Status: DISCONTINUED | OUTPATIENT
Start: 2018-01-28 | End: 2018-01-30 | Stop reason: HOSPADM

## 2018-01-28 RX ORDER — SODIUM CHLORIDE, SODIUM LACTATE, POTASSIUM CHLORIDE, CALCIUM CHLORIDE 600; 310; 30; 20 MG/100ML; MG/100ML; MG/100ML; MG/100ML
INJECTION, SOLUTION INTRAVENOUS CONTINUOUS
Status: DISCONTINUED | OUTPATIENT
Start: 2018-01-28 | End: 2018-01-28

## 2018-01-28 RX ORDER — ROPIVACAINE HYDROCHLORIDE 2 MG/ML
10 INJECTION, SOLUTION EPIDURAL; INFILTRATION; PERINEURAL ONCE
Status: COMPLETED | OUTPATIENT
Start: 2018-01-28 | End: 2018-01-28

## 2018-01-28 RX ADMIN — SODIUM CHLORIDE, POTASSIUM CHLORIDE, SODIUM LACTATE AND CALCIUM CHLORIDE 500 ML: 600; 310; 30; 20 INJECTION, SOLUTION INTRAVENOUS at 06:41

## 2018-01-28 RX ADMIN — OXYTOCIN-SODIUM CHLORIDE 0.9% IV SOLN 30 UNIT/500ML 340 ML/HR: 30-0.9/5 SOLUTION at 11:20

## 2018-01-28 RX ADMIN — ROPIVACAINE HYDROCHLORIDE 10 ML: 2 INJECTION, SOLUTION EPIDURAL; INFILTRATION at 06:16

## 2018-01-28 RX ADMIN — OXYTOCIN-SODIUM CHLORIDE 0.9% IV SOLN 30 UNIT/500ML 2 MILLI-UNITS/MIN: 30-0.9/5 SOLUTION at 08:46

## 2018-01-28 RX ADMIN — Medication 5 MG: at 06:41

## 2018-01-28 RX ADMIN — Medication 12 ML/HR: at 06:29

## 2018-01-28 RX ADMIN — ACETAMINOPHEN 650 MG: 325 TABLET, FILM COATED ORAL at 17:47

## 2018-01-28 RX ADMIN — SODIUM CHLORIDE, POTASSIUM CHLORIDE, SODIUM LACTATE AND CALCIUM CHLORIDE: 600; 310; 30; 20 INJECTION, SOLUTION INTRAVENOUS at 06:08

## 2018-01-28 RX ADMIN — IBUPROFEN 800 MG: 400 TABLET ORAL at 20:14

## 2018-01-28 RX ADMIN — ACETAMINOPHEN 650 MG: 325 TABLET, FILM COATED ORAL at 13:39

## 2018-01-28 RX ADMIN — SENNOSIDES AND DOCUSATE SODIUM 1 TABLET: 8.6; 5 TABLET ORAL at 20:14

## 2018-01-28 RX ADMIN — LIDOCAINE HYDROCHLORIDE AND EPINEPHRINE 3 ML: 15; 5 INJECTION, SOLUTION EPIDURAL at 06:11

## 2018-01-28 RX ADMIN — IBUPROFEN 800 MG: 400 TABLET ORAL at 13:39

## 2018-01-28 RX ADMIN — ACETAMINOPHEN 650 MG: 325 TABLET, FILM COATED ORAL at 22:27

## 2018-01-28 NOTE — IP AVS SNAPSHOT
MRN:[id number]                      After Visit Summary   1/28/2018    Reji Snyder    MRN: [id number]           Thank you!     Thank you for choosing [facility] for your care. Our goal is always to provide you with excellent care. Hearing back from our patients is one way we can continue to improve our services. Please take a few minutes to complete the written survey that you may receive in the mail after you visit with us. Thank you!        Patient Information     Date Of Birth          1990        About your hospital stay     You were admitted on:  January 28, 2018 You last received care in the:  [address]    You were discharged on:  January 30, 2018        Reason for your hospital stay       Maternity care                  Who to Call     For medical emergencies, please call 911.  For non-urgent questions about your medical care, please call your primary care provider or clinic, [phone]          Attending Provider     Provider Specialty    Alena Rico MD OB/Gyn    Masters, Amita Romeo DO OB/Gyn       Primary Care Provider Office Phone # Fax #    [facility] [phone]      After Care Instructions     Activity       Review discharge instructions            Diet       Resume previous diet            Discharge Instructions - Postpartum visit       Schedule postpartum visit with your provider and return to clinic in 6 weeks.                  Your next 10 appointments already scheduled     Feb 01, 2018 10:20 AM CST   ESTABLISHED PRENATAL with Amita Foleys,    [facility] ([facility])    [address] [place] [id number] [phone]              Further instructions from your care team       Postpartum Vaginal Delivery Instructions    Activity       Ask family and friends for help when you need it.    Do not place anything in your vagina  for 6 weeks.    You are not restricted on other activities, but take it easy for a few weeks to allow your body to recover from delivery.  You are able to do any activities you feel up to that point.    No driving until you have stopped taking your pain medications (usually two weeks after delivery).     Call your health care provider if you have any of these symptoms:       Increased pain, swelling, redness, or fluid around your stiches from an episiotomy or perineal tear.    A fever above 100.4 F (38 C) with or without chills when placing a thermometer under your tongue.    You soak a sanitary pad with blood within 1 hour, or you see blood clots larger than a golf ball.    Bleeding that lasts more than 6 weeks.    Vaginal discharge that smells bad.    Severe pain, cramping or tenderness in your lower belly area.    A need to urinate more frequently (use the toilet more often), more urgently (use the toilet very quickly), or it burns when you urinate.    Nausea and vomiting.    Redness, swelling or pain around a vein in your leg.    Problems breastfeeding or a red or painful area on your breast.    Chest pain and cough or are gasping for air.    Problems coping with sadness, anxiety, or depression.  If you have any concerns about hurting yourself or the baby, call your provider immediately.     You have questions or concerns after you return home.     Keep your hands clean:  Always wash your hands before touching your perineal area and stitches.  This helps reduce your risk of infection.  If your hands aren't dirty, you may use an alcohol hand-rub to clean your hands. Keep your nails clean and short.    DISCHARGE INSTRUCTIONS    Medications:  -May take Ibuprofen (800mg by mouth every 8 hours as needed for pain) or tylenol (500mg by mouth every 6 hours as needed for pain; max 4000mg per day) when at home as needed for pain/cramps/headaches/pain, follow instructions on bottle.  -You may use miralax (daily) or  "docusate (one tab by mouth twice daily) for stool softening, these are over the counter and can be found at any pharmacy. Follow bottle instructions.  -Continue prenatal vitamins.     Activity:  -Pelvic rest (no sex, tampons) for 6 weeks.   -General activity as tolerated, slowing increase daily. Avoid vigorous exercise, jumping or strength training for at least 4-6 weeks.  You may drive when you are able to safely operate a motor vehicle and are no longer taking narcotic medications if they have been prescribed for you.    Precuations:  -Call doctor if heavy unexpected bleeding, fever of 100.4 or greater, foul vaginal discharge, severe abdominal pain not helped with medications or for any other concerns or questions. If you are having headaches not resolved by tylenol or ibuprofen, new or different vision changes then notify your doctor.    Follow Up Visit:  -Call the [facility] to schedule your 6 week postpartum appointment, [phone].             Pending Results     No orders found from 1/26/2018 to 1/29/2018.            Statement of Approval     Ordered          01/30/18 1054  I have reviewed and agree with all the recommendations and orders detailed in this document.  EFFECTIVE NOW     Approved and electronically signed by:  Alena Rico MD             Admission Information     Date & Time Provider Department Dept. Phone    1/28/2018 Masters, Amita Romeo DO [address] [phone]      Your Vitals Were     Blood Pressure Pulse Temperature Respirations Height Weight    123/82 75 97.9  F (36.6  C) (Oral) 16 1.676 m (5' 6\") 64 kg (141 lb)    Last Period Pulse Oximetry BMI (Body Mass Index)             05/28/2017 99% 22.76 kg/m2         MyChart Information     [facility] gives you secure access to your electronic health record. If you see a primary care provider, you can also send messages to your care team and make appointments. If you have questions, please call " your primary care clinic.  If you do not have a primary care provider, please call [phone] and they will assist you.        Care EveryWhere ID     This is your Care EveryWhere ID. This could be used by other organizations to access your [facility] medical records  [id number]        Equal Access to Services     BILL OTERO : Jeremias padilla Sorocio, waaxda luqadaha, qaybta kaalmada sarah, erica tripathi. So [facility] [phone].    ATENCIÓN: Si habla español, tiene a cheek disposición servicios gratuitos de asistencia lingüística. Antonio al [phone].    We comply with applicable federal civil rights laws and [place] laws. We do not discriminate on the basis of race, color, national origin, age, disability, sex, sexual orientation, or gender identity.               Review of your medicines      START taking        Dose / Directions    acetaminophen 325 MG tablet   Commonly known as:  TYLENOL   Used for:  Status post vacuum-assisted vaginal delivery, Perineal laceration, sequela        Dose:  650 mg   Take 2 tablets (650 mg) by mouth every 4 hours as needed for mild pain or fever (greater than or equal to 38? C /100.4? F (oral) or 38.5? C/ 101.4? F (core).)   Quantity:  100 tablet   Refills:  1       ibuprofen 800 MG tablet   Commonly known as:  ADVIL/MOTRIN   Used for:  Perineal laceration, sequela, Status post vacuum-assisted vaginal delivery        Dose:  800 mg   Take 1 tablet (800 mg) by mouth every 6 hours as needed for other (cramping)   Quantity:  90 tablet   Refills:  1       lidocaine 4 % Crea cream   Commonly known as:  LMX4   Used for:  Labial pain        Dose:  1 g   Apply 1 g topically every 2 hours as needed for mild pain   Quantity:  45 g   Refills:  0       oxyCODONE IR 5 MG tablet   Commonly known as:  ROXICODONE   Used for:  Perineal laceration, sequela, Status post vacuum-assisted vaginal delivery        Dose:  5 mg   Take 1 tablet (5 mg) by mouth every 4  hours as needed for other (pain control or improvement in physical function.)   Quantity:  18 tablet   Refills:  0         CONTINUE these medicines which have NOT CHANGED        Dose / Directions    order for DME   Used for:  Leg swelling in pregnancy in third trimester, 31 weeks gestation of pregnancy        Compression stockings, two pair, knee high Indication: LE swelling in pregnancy Wear during the day, remove at bedtime.   Quantity:  2 each   Refills:  0       polyethylene glycol powder   Commonly known as:  MIRALAX   Used for:  Constipation during pregnancy in third trimester        Dose:  1 capful   Take 17 g (1 capful) by mouth daily   Quantity:  510 g   Refills:  1       prenatal multivitamin plus iron 27-0.8 MG Tabs per tablet   Used for:  Pregnancy test positive        Dose:  1 tablet   Take 1 tablet by mouth daily   Quantity:  100 tablet   Refills:  3            Where to get your medicines      Some of these will need a paper prescription and others can be bought over the counter. Ask your nurse if you have questions.     Bring a paper prescription for each of these medications     acetaminophen 325 MG tablet    ibuprofen 800 MG tablet    lidocaine 4 % Crea cream    oxyCODONE IR 5 MG tablet                Protect others around you: Learn how to safely use, store and throw away your medicines at www.disposemymeds.org.        Information about OPIOIDS     PRESCRIPTION OPIOIDS: WHAT YOU NEED TO KNOW    Prescription opioids can be used to help relieve moderate to severe pain and are often prescribed following a surgery or injury, or for certain health conditions. These medications can be an important part of treatment but also come with serious risks. It is important to work with your health care provider to make sure you are getting the safest, most effective care.    WHAT ARE THE RISKS AND SIDE EFFECTS OF OPIOID USE?  Prescription opioids carry serious risks of addiction and overdose, especially with  prolonged use. An opioid overdose, often marked by slowed breathing can cause sudden death. The use of prescription opioids can have a number of side effects as well, even when taken as directed:      Tolerance - meaning you might need to take more of a medication for the same pain relief    Physical dependence - meaning you have symptoms of withdrawal when a medication is stopped    Increased sensitivity to pain    Constipation    Nausea, vomiting, and dry mouth    Sleepiness and dizziness    Confusion    Depression    Low levels of testosterone that can result in lower sex drive, energy, and strength    Itching and sweating    RISKS ARE GREATER WITH:    History of drug misuse, substance use disorder, or overdose    Mental health conditions (such as depression or anxiety)    Sleep apnea    Older age (65 years or older)    Pregnancy    Avoid alcohol while taking prescription opioids.   Also, unless specifically advised by your health care provider, medications to avoid include:    Benzodiazepines (such as Xanax or Valium)    Muscle relaxants (such as Soma or Flexeril)    Hypnotics (such as Ambien or Lunesta)    Other prescription opioids    KNOW YOUR OPTIONS:  Talk to your health care provider about ways to manage your pain that do not involve prescription opioids. Some of these options may actually work better and have fewer risks and side effects:    Pain relievers such as acetaminophen, ibuprofen, and naproxen    Some medications that are also used for depression or seizures    Physical therapy and exercise    Cognitive behavioral therapy, a psychological, goal-directed approach, in which patients learn how to modify physical, behavioral, and emotional triggers of pain and stress    IF YOU ARE PRESCRIBED OPIOIDS FOR PAIN:    Never take opioids in greater amounts or more often than prescribed    Follow up with your primary health care provider and work together to create a plan on how to manage your pain.    Talk  about ways to help manage your pain that do not involve prescription opioids    Talk about all concerns and side effects    Help prevent misuse and abuse    Never sell or share prescription opioids    Never use another person's prescription opioids    Store prescription opioids in a secure place and out of reach of others (this may include visitors, children, friends, and family)    Visit www.cdc.gov/drugoverdose to learn about risks of opioid abuse and overdose    If you believe you may be struggling with addiction, tell your health care provider and ask for guidance or call [facility]'s National Helpline at 8-193-381-HELP    LEARN MORE / www.cdc.gov/drugoverdose/prescribing/guideline.html    Safely dispose of unused prescription opioids: Find your local drug take-back programs and more information about the importance of safe disposal at www.doseofreality.[place].gov             Medication List: This is a list of all your medications and when to take them. Check marks below indicate your daily home schedule. Keep this list as a reference.      Medications           Morning Afternoon Evening Bedtime As Needed    acetaminophen 325 MG tablet   Commonly known as:  TYLENOL   Take 2 tablets (650 mg) by mouth every 4 hours as needed for mild pain or fever (greater than or equal to 38? C /100.4? F (oral) or 38.5? C/ 101.4? F (core).)   Last time this was given:  650 mg on 1/30/2018 12:22 PM                                ibuprofen 800 MG tablet   Commonly known as:  ADVIL/MOTRIN   Take 1 tablet (800 mg) by mouth every 6 hours as needed for other (cramping)   Last time this was given:  800 mg on 1/30/2018 12:21 PM                                lidocaine 4 % Crea cream   Commonly known as:  LMX4   Apply 1 g topically every 2 hours as needed for mild pain                                order for DME   Compression stockings, two pair, knee high Indication: LE swelling in pregnancy Wear during the day, remove at bedtime.                                 oxyCODONE IR 5 MG tablet   Commonly known as:  ROXICODONE   Take 1 tablet (5 mg) by mouth every 4 hours as needed for other (pain control or improvement in physical function.)   Last time this was given:  5 mg on 1/29/2018  6:48 AM                                polyethylene glycol powder   Commonly known as:  MIRALAX   Take 17 g (1 capful) by mouth daily                                prenatal multivitamin plus iron 27-0.8 MG Tabs per tablet   Take 1 tablet by mouth daily   Last time this was given:  1 tablet on 1/29/2018  8:57 AM

## 2018-01-28 NOTE — L&D DELIVERY NOTE
Delivery Date:  2018      HISTORY OF PRESENT ILLNESS:  Reji is a 28-year-old  1, para 0, whose pregnancy was followed by Dr. Amita Lundberg and was uncomplicated.  The patient is blood type A positive.  She is rubella immune and group B strep negative.  She passed her 1 hour GCT and had a normal hemoglobin at 28 weeks.  She received both flu shot and Tdap shots during her pregnancy.  Her dating was by a last menstrual period confirmed by 16 week ultrasound and then she had a normal anatomy ultrasound as well putting her at 38+6 today.  The patient called yesterday evening with complaints of every 3 minute contractions that were somewhat mild and this was at approximately 11:00 p.m.  She continued to have progressive increase in the pain of her contractions and presented to Labor and Delivery at which time she was only 1 cm dilated and 90% effaced but masha very uncomfortably.        DESCRIPTION OF PROCEDURE:  Just under an hour later, the patient was 2 cm and was admitted in active labor.  Her active labor onset was documented at 4:56 a.m. The patient received an epidural for pain control and then her contractions did space out quite a bit so she was started on Pitocin.  She only reached a maximum of 4 milliunits as she continued to contract regularly.  She did have a couple bradycardic episodes due to some hypotension after the epidural, but these resolved with position changes, oxygen and ephedrine.  The patient actually then made fairly rapid progress being 5 cm at 8:40 in the morning and then just a right-sided cervical lip at 9:40 in the morning.  She was then called complete at 10:21 a.m. when she was feeling pressure.  She then began to push at 10:46 a.m. and despite having excellent maternal pushing efforts, there began to be some prolonged bradycardic episodes down to the 80s.  These intermittently resolved with some fetal scalp stim and there was oxygen in place throughout the entire  pushing phase, but there continued to be some bradycardia.  Also of note, artificial rupture of membranes was done at approximately 9:40 and there was meconium noted.  Because there was already meconium and because of these bradycardic episodes, we decided to do a vacuum-assisted vaginal delivery.  The infant was at a +3 station.  The estimated fetal weight was 6-1/2 pounds and the infant was thought to be either asynclitic or in a somewhat OP position.  We discussed vacuum delivery with the patient as well as all the risks and benefits.  Anesthesia and emergency surgical teams were immediately available and the NNP was called.  The patient agreed to proceed with vacuum delivery.  In addition, to this, the patient had had a female circumcision done that was leaving only a very small opening in the skin.  It was felt that to facilitate vacuum placement and to facilitate delivery that this needed to be .  The labial tissue was elevated off of the underlying vaginal mucosa and urethra and an approximately 4 cm cut was made to separate the labial tissues.  The lower portion of the right labia was bleeding and so a figure-of-x 3-0 Vicryl stitch was placed through this to obtain hemostasis.  This was all done prior to vacuum placement.  The vacuum was then applied and with 1 pull and no pop offs for a total pulling time of 30 seconds, the infant was delivered in a left occiput transverse position.  The umbilical cord was noted to be compressed between the infant's back and the vaginal sidewalls, likely accounting for the fetal heart rate abnormalities.  The infant otherwise easily and atraumatically delivered and delivered onto the maternal chest and abdomen.  The  nurse practitioner was not needed and no resuscitation was needed.  Umbilical cord clamping was delayed and then the cord was doubly clamped and cut, with the infant on the maternal chest.  The infant delivered at 11:13 a.m.  His Apgars were 8  and 9 at one and five minutes, respectively and his weight is still pending at the time of this dictation.      The placenta then delivered intact with a normal 3-vessel cord at 11:17 a.m.  Of note, there was a marginal cord insertion, but no other abnormalities.  The patient sustained a second-degree perineal laceration.  The patient has a very short perineum so the second-degree went up to the anal verge but the rectal capsule was not seen in any way.  This was repaired in the normal fashion with a running 3-0 Vicryl stitch.  We then placed 3 interrupted 3-0 Vicryl stitches on the left labia and then 4 total sutures on the right labia where the female circumcision had been reversed.  There was excellent hemostasis noted.  I then injected 4 mL in each of the labia and then an additional 2 mL into the perineum of 1% lidocaine for continued pain relief after the epidural.      The patient tolerated the procedure well and there were no complications.  All sponge, lap and instrument counts were correct x2.      DIAGNOSES:   1.  Intrauterine pregnancy at 38+6 with spontaneous labor.   2.  Pitocin augmentation of labor.   3.  Epidural for pain control.   4.  Reversal of female circumcision and subsequent repair.   5.  Vacuum-assisted vaginal delivery of a viable male infant.   6.  Repair of second-degree perineal laceration.         CHITRA BLANC MD             D: 2018   T: 2018   MT: [place]      Name:     EMERSON MANDUJANO   MRN:      [id number]        Account:        [id number]   :      1990        Delivery Date:  2018               Document: [id number]

## 2018-01-28 NOTE — PLAN OF CARE
38+6 arrives to MAC via w/c c/o ctxs.  Denies SROM.  Pt to room, monitors applied with consent, assessment made.  SVE performed.  POC to ambulate for one hour and then recheck cervix.  0415 pt states she cant walk anymore.  Pt to room, monitors applied.  Will wait until full hour is up.  POC is ok with this.  0500 Dr Rico updated via phone on but not limited FHT, ctxs, sve, vs, pmh.  POC to admit .  Pt states her  [place] the time.  0505 pt ambulatory to room 232.  Report to Consuelo CLINTON Rn to assume care at this time.

## 2018-01-28 NOTE — TELEPHONE ENCOUNTER
"Reji calling concerned about her current symptoms. She reports \"I am pregnant with my first baby and I've had contractions all day today, now it is a lower back ache and my stomach feels tight\" and \"I also noticed some bloody gel-like drainage.\" She denies gush of fluid from vagina. Reports baby is moving \"normal\" today.  JONATHAN is 18. Reji reports her pregnancy has \"been normal\". She is rating her back pain a \"5/10 now\". Reji reports her \"contractions or \"pains\" are <6 minutes apart, though her voice sounded unsure. Educated on symptoms of true vs false labor and timing contractions. Care advice given per guideline protocol; advised Reji to speak to on-call OB provider now for 2nd level triage and direction. This nurse paged the on-call provider (Dr. Blanc) at 11:44 pm for [facility] via [employer] web to call the patient back directly at [phone]. Reji verbalized understanding of care advice given.    Encouraged call back to [facility]NA if no call from provider within 30 minutes.    Wilma Mendoza RN  [facility]    CHITRA BLANC MD [[id number]]    WILMA MENDOZA [[id number]] 2018 11:44 PM [phone] PAGE SENT messageID: [id number], Reji patient, [phone], re AYUSH Snyder,  1990, MRN [id number]. JONATHAN 17. Pt unsure if in true labor, need 2nd level triage for contractions < 6 mins? OB is Masters    Reason for Disposition    [1] First baby (primipara) AND [2] contractions < 6 minutes apart  AND [3] present 2 hours     Pt feels she is having contractions or pains every < 6 minutes tonight. Denies gush of fluid from vagina but does report bloody gel-like substance from vagina. She is c/o bad back pain and a tight stomach.    Additional Information    Negative: Passed out (i.e., lost consciousness, collapsed and was not responding)    Negative: Shock suspected (e.g., cold/pale/clammy skin, too weak to stand, low BP, rapid pulse)    Negative: Difficult to awaken or acting confused  (e.g., " "disoriented, slurred speech)    Negative: [1] SEVERE abdominal pain (e.g., excruciating) AND [2] constant AND [3] present > 1 hour    Negative: Severe bleeding (e.g., continuous red blood from vagina, or large blood clots)    Negative: Umbilical cord hanging out of the vagina (shiny, white, curled appearance, \"like telephone cord\")    Negative: Uncontrollable urge to push (i.e., feels like baby is coming out now)    Negative: Can see baby    Negative: Sounds like a life-threatening emergency to the triager    Negative: Pregnant < 37 weeks (i.e., )    Negative: [1] Uncertain delivery date AND [2] possibly pregnant < 37 weeks (i.e., )    Protocols used: PREGNANCY - LABOR-ADULT-    "

## 2018-01-28 NOTE — PLAN OF CARE
Problem: Labor (Cervical Ripen, Induct, Augment) (Adult,Obstetrics,Pediatric)  Goal: Signs and Symptoms of Listed Potential Problems Will be Absent, Minimized or Managed (Labor)  Signs and symptoms of listed potential problems will be absent, minimized or managed by discharge/transition of care (reference Labor (Cervical Ripen, Induct, Augment) (Adult,Obstetrics,Pediatric) C[facility]).   Outcome: Completed Date Met: 01/28/18  Patient resting comfortably with infant nursing skin to skin. Dr. Rico in attendance for delivery. See delivery summary and flowsheet for details. Report given to Zaynab ADAN RN for recovery.

## 2018-01-28 NOTE — PLAN OF CARE
Problem: Patient Care Overview  Goal: Plan of Care/Patient Progress Review  Outcome: No Change  Patient taking Tylenol and Ibuprofen for pain with adequate pain relief. Patient unable to ambulate to bathroom, chelsea steady used. Patient unable to void in bathroom, FF U/1 SR, encouraged patient to drink plenty of fluids and to try again in 1 hour (1600) with nurse. Patient transferred to room 425 @1500, report given to Catia ALONSO. Call light within reach.

## 2018-01-28 NOTE — L&D DELIVERY NOTE
Vacuum delivery of viable male at 1113   Vacuum performed for fetal status   Vacuum on for 1 pulls/CTXs with 0 pop-offs.   Baby was in LOT position, +3 station, EFW 6.5#   Alternative strategies were discussed.   Consent Obtained   Surgical and resuscitation teams were available.   Baby's weight unavailable     Apgars 8, 9   Meconium present but only bulb suction needed  Cord compressed behind baby's back likely accounting for the heart rate decelerations

## 2018-01-28 NOTE — PLAN OF CARE
Dr. Rico paged with call returned. Updated regarding latest SVE. Orders received to start pushing. Dr. Rico in call room on unit, will page when needed.

## 2018-01-28 NOTE — H&P
No significant change in general health status based on examination of the patient, review of Nursing Admission Database and prenatal record.    EFW: 6lb 8oz

## 2018-01-28 NOTE — PLAN OF CARE
SBAR report received from Latisha PEACOCK RN, will assume all cares for this patient. Patient admitted to room 232. IV started and bolus begun for epidural. Dr. Vinson placed epidural without difficulty. Patient placed into left tilt position after epidural placement.  FHT dropped to 90, patient placed in right lateral position without rise in FHT. Patient placed in left lateral position, O2 started, IV blous started, ephedrine 5mg given IV push and blood pressure cuff placed on right calf. FHT back to 140. Patient comfortable and resting. SBAR report to Laury ADAN RN to assume all cares from this time.

## 2018-01-28 NOTE — ANESTHESIA PROCEDURE NOTES
Peripheral nerve/Neuraxial procedure note : epidural catheter  Pre-Procedure  Performed by MARIA INES HUGGINS  Location: OB      Pre-Anesthestic Checklist: patient identified, IV checked, risks and benefits discussed, informed consent and pre-op evaluation    Timeout  Correct Patient: Yes   Correct Procedure: Yes   Correct Site: Yes   Correct Laterality: N/A   Correct Position: Yes   Site Marked: N/A   .   Procedure Documentation    .    Procedure:    Epidural catheter.  Insertion Site:L3-4  (midline approach) Injection technique: LORT saline   Local skin infiltrated with mL of 1% lidocaine.  SURJIT at 5 cm     Patient Prep;mask, sterile gloves, povidone-iodine 7.5% surgical scrub, patient draped.  .  Needle: Touhy needle Needle Gauge: 17.    Needle Length (Inches) 3.5  # of attempts: 1 and # of redirects:  .   . .  Catheter threaded easily  5 cm epidural space.  .   .    Assessment/Narrative  Paresthesias: No.  .  .  Aspiration negative for heme or CSF  . Test dose of 3 mL lidocaine 1.5% w/ 1:200,000 epinephrine at. Test dose negative for signs of intravascular, subdural or intrathecal injection.

## 2018-01-28 NOTE — IP AVS SNAPSHOT
[address]    [place] [address]    Phone:  [phone]                                       After Visit Summary   1/28/2018    Reji Snyder    MRN: [id number]           After Visit Summary Signature Page     I have received my discharge instructions, and my questions have been answered. I have discussed any challenges I see with this plan with the nurse or doctor.    ..........................................................................................................................................  Patient/Patient Representative Signature      ..........................................................................................................................................  Patient Representative Print Name and Relationship to Patient    ..................................................               ................................................  Date                                            Time    ..........................................................................................................................................  Reviewed by Signature/Title    ...................................................              ..............................................  Date                                                            Time

## 2018-01-28 NOTE — PLAN OF CARE
Dr. Rico paged with call returned. Updated regarding, but not limited to, contractions beginning to space out. Fetal strip shows mostly minimal variability with occasional periods of moderate, O2 on. Orders received to start pitocin per protocol.

## 2018-01-29 PROCEDURE — 25000132 ZZH RX MED GY IP 250 OP 250 PS 637: Performed by: OBSTETRICS & GYNECOLOGY

## 2018-01-29 PROCEDURE — 12000037 ZZH R&B POSTPARTUM INTERMEDIATE

## 2018-01-29 RX ORDER — OXYCODONE HYDROCHLORIDE 5 MG/1
5 TABLET ORAL EVERY 4 HOURS PRN
Qty: 18 TABLET | Refills: 0 | Status: SHIPPED | OUTPATIENT
Start: 2018-01-29 | End: 2018-04-02

## 2018-01-29 RX ORDER — ACETAMINOPHEN 325 MG/1
650 TABLET ORAL EVERY 4 HOURS PRN
Qty: 100 TABLET | Refills: 1 | Status: SHIPPED | OUTPATIENT
Start: 2018-01-29 | End: 2018-04-02

## 2018-01-29 RX ORDER — IBUPROFEN 800 MG/1
800 TABLET, FILM COATED ORAL EVERY 6 HOURS PRN
Qty: 90 TABLET | Refills: 1 | Status: SHIPPED | OUTPATIENT
Start: 2018-01-29 | End: 2018-04-02

## 2018-01-29 RX ADMIN — IBUPROFEN 800 MG: 400 TABLET ORAL at 03:42

## 2018-01-29 RX ADMIN — PRENATAL VIT W/ FE FUMARATE-FA TAB 27-0.8 MG 1 TABLET: 27-0.8 TAB at 08:57

## 2018-01-29 RX ADMIN — SENNOSIDES AND DOCUSATE SODIUM 1 TABLET: 8.6; 5 TABLET ORAL at 21:38

## 2018-01-29 RX ADMIN — ACETAMINOPHEN 650 MG: 325 TABLET, FILM COATED ORAL at 14:24

## 2018-01-29 RX ADMIN — SENNOSIDES AND DOCUSATE SODIUM 1 TABLET: 8.6; 5 TABLET ORAL at 08:57

## 2018-01-29 RX ADMIN — IBUPROFEN 800 MG: 400 TABLET ORAL at 11:27

## 2018-01-29 RX ADMIN — ACETAMINOPHEN 650 MG: 325 TABLET, FILM COATED ORAL at 03:42

## 2018-01-29 RX ADMIN — IBUPROFEN 800 MG: 400 TABLET ORAL at 18:06

## 2018-01-29 RX ADMIN — ACETAMINOPHEN 650 MG: 325 TABLET, FILM COATED ORAL at 21:38

## 2018-01-29 RX ADMIN — OXYCODONE HYDROCHLORIDE 5 MG: 5 TABLET ORAL at 01:14

## 2018-01-29 RX ADMIN — OXYCODONE HYDROCHLORIDE 5 MG: 5 TABLET ORAL at 06:48

## 2018-01-29 RX ADMIN — ACETAMINOPHEN 650 MG: 325 TABLET, FILM COATED ORAL at 08:57

## 2018-01-29 NOTE — PLAN OF CARE
Problem: Patient Care Overview  Goal: Plan of Care/Patient Progress Review  Outcome: No Change  VSS. Fundus firm at midline. Pain well controlled with Tylenol and Ibuprofen.  Up with SBA to the bathroom. Voided X1 after delivery. Working on breastfeeding  and  cares, with assist from writer. Pt has no support here.  is out of town until Wednesday and pt has no car seat or baby clothes here. Social Work consult placed to help with discharge. Continue to monitor and notify MD as needed.

## 2018-01-29 NOTE — PLAN OF CARE
Problem: Patient Care Overview  Goal: Plan of Care/Patient Progress Review  Outcome: No Change  Vitally stable. Fundus firm, midline and U/1 . Small rubra lochia.  Pain well controlled with tylenol and ibuprofen, and pt also needing one dose of oxycodone overnight. Adequate u/o. Breastfeeding every 2-3 hours. Up independently and making needs known.

## 2018-01-29 NOTE — PLAN OF CARE
Problem: Patient Care Overview  Goal: Plan of Care/Patient Progress Review  Outcome: Improving  Vitals Stable, up to shower independently, here alone, no visitors.  out of town, will be home Wednesday, Social Service consult to assist with discharge tomorrow. Breastfeeding and Bottle feeding infant independently.

## 2018-01-29 NOTE — DISCHARGE INSTRUCTIONS
Postpartum Vaginal Delivery Instructions    Activity       Ask family and friends for help when you need it.    Do not place anything in your vagina for 6 weeks.    You are not restricted on other activities, but take it easy for a few weeks to allow your body to recover from delivery.  You are able to do any activities you feel up to that point.    No driving until you have stopped taking your pain medications (usually two weeks after delivery).     Call your health care provider if you have any of these symptoms:       Increased pain, swelling, redness, or fluid around your stiches from an episiotomy or perineal tear.    A fever above 100.4 F (38 C) with or without chills when placing a thermometer under your tongue.    You soak a sanitary pad with blood within 1 hour, or you see blood clots larger than a golf ball.    Bleeding that lasts more than 6 weeks.    Vaginal discharge that smells bad.    Severe pain, cramping or tenderness in your lower belly area.    A need to urinate more frequently (use the toilet more often), more urgently (use the toilet very quickly), or it burns when you urinate.    Nausea and vomiting.    Redness, swelling or pain around a vein in your leg.    Problems breastfeeding or a red or painful area on your breast.    Chest pain and cough or are gasping for air.    Problems coping with sadness, anxiety, or depression.  If you have any concerns about hurting yourself or the baby, call your provider immediately.     You have questions or concerns after you return home.     Keep your hands clean:  Always wash your hands before touching your perineal area and stitches.  This helps reduce your risk of infection.  If your hands aren't dirty, you may use an alcohol hand-rub to clean your hands. Keep your nails clean and short.    DISCHARGE INSTRUCTIONS    Medications:  -May take Ibuprofen (800mg by mouth every 8 hours as needed for pain) or tylenol (500mg by mouth every 6 hours as needed for  pain; max 4000mg per day) when at home as needed for pain/cramps/headaches/pain, follow instructions on bottle.  -You may use miralax (daily) or docusate (one tab by mouth twice daily) for stool softening, these are over the counter and can be found at any pharmacy. Follow bottle instructions.  -Continue prenatal vitamins.     Activity:  -Pelvic rest (no sex, tampons) for 6 weeks.   -General activity as tolerated, slowing increase daily. Avoid vigorous exercise, jumping or strength training for at least 4-6 weeks.  You may drive when you are able to safely operate a motor vehicle and are no longer taking narcotic medications if they have been prescribed for you.    Precuations:  -Call doctor if heavy unexpected bleeding, fever of 100.4 or greater, foul vaginal discharge, severe abdominal pain not helped with medications or for any other concerns or questions. If you are having headaches not resolved by tylenol or ibuprofen, new or different vision changes then notify your doctor.    Follow Up Visit:  -Call the [facility] to schedule your 6 week postpartum appointment, [phone].

## 2018-01-29 NOTE — LACTATION NOTE
Initial visit.   Breastfeeding information reviewed.  Advised to breastfeed exclusively, on demand, avoid pacifiers, bottles and formula unless medically indicated.  Encouraged rooming in, skin to skin, feeding on demand 8-12x/day or sooner if baby cues.  Explained benefits of holding and skin to skin.  Encouraged lots of skin to skin.   Continues to nurse well per mom. No further questions at this time.   Will follow as needed.   Radha Singer RNC, IBCLC

## 2018-01-30 VITALS
RESPIRATION RATE: 16 BRPM | HEART RATE: 75 BPM | WEIGHT: 141 LBS | TEMPERATURE: 97.9 F | HEIGHT: 66 IN | DIASTOLIC BLOOD PRESSURE: 82 MMHG | OXYGEN SATURATION: 99 % | BODY MASS INDEX: 22.66 KG/M2 | SYSTOLIC BLOOD PRESSURE: 123 MMHG

## 2018-01-30 PROCEDURE — 25000132 ZZH RX MED GY IP 250 OP 250 PS 637: Performed by: OBSTETRICS & GYNECOLOGY

## 2018-01-30 RX ORDER — BREAST PUMP
1 EACH MISCELLANEOUS ONCE
Qty: 1 EACH | Refills: 0 | Status: SHIPPED | OUTPATIENT
Start: 2018-01-30 | End: 2018-01-30

## 2018-01-30 RX ORDER — LIDOCAINE 40 MG/G
1 CREAM TOPICAL
Qty: 45 G | Refills: 0 | Status: SHIPPED | OUTPATIENT
Start: 2018-01-30 | End: 2018-04-02

## 2018-01-30 RX ADMIN — ACETAMINOPHEN 650 MG: 325 TABLET, FILM COATED ORAL at 03:30

## 2018-01-30 RX ADMIN — ACETAMINOPHEN 650 MG: 325 TABLET, FILM COATED ORAL at 12:22

## 2018-01-30 RX ADMIN — IBUPROFEN 800 MG: 400 TABLET ORAL at 12:21

## 2018-01-30 RX ADMIN — IBUPROFEN 800 MG: 400 TABLET ORAL at 03:30

## 2018-01-30 NOTE — PROGRESS NOTES
Obstetrics Postpartum Rounding Note, PPD#1    S: Doing well. Pain controlled with oral ibuprofen/tylenol and oxycodone. Breast feeding. Minimal lochia.       O:   Vitals:    01/29/18 0215 01/29/18 0648 01/29/18 0815 01/29/18 1500   BP:   114/73 125/82   BP Location:   Right arm    Pulse:   75    Resp: 16 16 16 16   Temp:   98  F (36.7  C) 97.5  F (36.4  C)   TempSrc:   Oral Oral   SpO2:       Weight:       Height:           Gen: AOx3, NAD  Abd: soft, ND, mild approp ttp, FF@ U-1.   Ext: no calf ttp, no edema    Labs:         Hemoglobin   Date Value Ref Range Status   01/28/2018 14.3 11.7 - 15.7 g/dL Final   11/16/2017 13.2 11.7 - 15.7 g/dL Final            Lab Results   Component Value Date    RH Pos 01/28/2018       Meds:  Current Facility-Administered Medications   Medication     prenatal multivitamin plus iron per tablet 1 tablet     oxytocin (PITOCIN) 30 units in 500 mL 0.9% NaCl infusion     ibuprofen (ADVIL/MOTRIN) tablet 800 mg     acetaminophen (TYLENOL) tablet 650 mg     naloxone (NARCAN) injection 0.1-0.4 mg     senna-docusate (SENOKOT-S;PERICOLACE) 8.6-50 MG per tablet 1 tablet    Or     senna-docusate (SENOKOT-S;PERICOLACE) 8.6-50 MG per tablet 2 tablet     [START ON 1/30/2018] bisacodyl (DULCOLAX) Suppository 10 mg     [START ON 1/30/2018] sodium phosphate (FLEET ENEMA) 1 enema     hydrocortisone 2.5 % cream     lanolin ointment     lactated ringers BOLUS 1,000 mL     oxytocin (PITOCIN) 30 units in 500 mL 0.9% NaCl infusion     oxytocin (PITOCIN) injection 10 Units     misoprostol (CYTOTEC) tablet 400 mcg     NO Rho (D) immune globulin (RhoGam) needed - mother Rh POSITIVE     oxyCODONE IR (ROXICODONE) tablet 5 mg     HYDROmorphone (PF) (DILAUDID) injection 0.3 mg     No MMR Needed - Assessment: Patient does not need MMR vaccine     No Tdap Needed - Assessment: Patient does not need Tdap vaccine     lidocaine (LMX4) cream     Facility-Administered Medications Ordered in Other Encounters   Medication      lidocaine-EPINEPHrine 1.5 %-1:[id number] injection       A/P: PPD#1 s/p VAVD doing well.  -Continue routine care.  -Anticipate discharge tomorrow    Amita Romeo Masters, DO  January 29, 2018

## 2018-01-30 NOTE — PROGRESS NOTES
Care Transition Initial Assessment -      Met with: Patient    Active Problems:    Indication for care in labor and delivery, antepartum    Labor and delivery, indication for care    Pregnancy    Vacuum extraction, delivered, current hospitalization         DATA  Lives With: spouse  Living Arrangements: apartment  Description of Support System: Supportive, Involved.  is an over the road  and was not home for delivery due to baby arriving a couple of weeks early.   Who is your support system?:   Support Assessment: Limited social contact and support. Patient's family lives in [place] and will be coming to assist later.  Identified issues/concerns regarding health management: None.      Resources List: Day Care, Other (Comments)  Other Resources: [facility] info and contact info, New Parent Resource brochure, Fire Dept check of car seat for safety, Post Partum education and literature.  Quality Of Family Relationships: unable to assess  Transportation Available: car  Patient to DC and drive self home to get babies car seat, put it together and stop by fire station for safety check prior to returning to hospital for baby.    ASSESSMENT  Cognitive Status:  awake, alert and oriented  Concerns to be addressed: All concerns addressed.  Patient presented as happy, approachable, thankful for resources and information.  Patient states she has all necessary equipment and supplies at home.  Patient indicates  will be home around midnight tonight and will assist with care of .     PLAN  Financial costs for the patient includes Not discussed.  Patient given options and choices for discharge : Patient planning to DC to home with baby.  Patient/family is agreeable to the plan?  Yes: No SS concerns noted.  Patient Goals and Preferences: Home with assist.  Patient anticipates discharging to:  Home.

## 2018-01-30 NOTE — PLAN OF CARE
Problem: Patient Care Overview  Goal: Plan of Care/Patient Progress Review  Outcome: Adequate for Discharge Date Met: 01/30/18  Pt VSS, up in room, pain managed w/Tylenol & Ibuprofen, using Tucks to perineum, firm fundus @ U-1w/scant rubra flow, D/C & f/u instructions reviewed w/pt, all questions answered, D/C Meds given to patient, plan D/C home return w/car seat for Infant.

## 2018-01-30 NOTE — PLAN OF CARE
Problem: Patient Care Overview  Goal: Plan of Care/Patient Progress Review  Outcome: Improving  Pt on pathway. Taking Tylenol and Ibuprofen as needed for pain. Breastfeeding  and bottle feeding as well. Pt requested  to be in the nursery in between feedings. Will continue to monitor. Encouraged pt to call with any needs or questions.

## 2018-01-30 NOTE — PROGRESS NOTES
"S: Pt doing well. Infant is being . Pain is controlled with current analgesics.  Medication(s) being used: acetaminophen and ibuprofen (OTC). Opt took oxy twice yesterday for pain but hasn't needed it since. Didn't know she had a topical lidocaine ordered for her so hasn't used it but would like to have it at d/c. Anticipates  will be home around midnight tonight so will have help. Has to have a car seat from social work b/c has it at home and didn't grab it when she drove herself in to labor 2 days ago    O: /82  Pulse 75  Temp 97.9  F (36.6  C) (Oral)  Resp 16  Ht 1.676 m (5' 6\")  Wt 64 kg (141 lb)  LMP 05/28/2017  SpO2 99%  Breastfeeding? Unknown  BMI 22.76 kg/m2        ABD:  Uterine fundus is firm, non-tender and at the level of the umbilicus                Hemoglobin   Date Value Ref Range Status   01/28/2018 14.3 11.7 - 15.7 g/dL Final            Lab Results   Component Value Date    RH Pos 01/28/2018       A:  Post-partum day #2 s/p Normal spontaneous vaginal delivery  And female circumcision revision    P: Continue PP Cares  D/c home and f/u in 6 weeks with Dr. [place]berg    "

## 2018-04-02 ENCOUNTER — PRENATAL OFFICE VISIT (OUTPATIENT)
Dept: OBGYN | Facility: CLINIC | Age: 28
End: 2018-04-02
Payer: MEDICAID

## 2018-04-02 VITALS
DIASTOLIC BLOOD PRESSURE: 68 MMHG | SYSTOLIC BLOOD PRESSURE: 112 MMHG | BODY MASS INDEX: 21.38 KG/M2 | HEART RATE: 70 BPM | WEIGHT: 133 LBS | HEIGHT: 66 IN

## 2018-04-02 DIAGNOSIS — Z97.5 PRESENCE OF INTRAUTERINE CONTRACEPTIVE DEVICE: ICD-10-CM

## 2018-04-02 DIAGNOSIS — Z30.430 ENCOUNTER FOR IUD INSERTION: ICD-10-CM

## 2018-04-02 PROBLEM — Z34.00 SUPERVISION OF NORMAL IUP (INTRAUTERINE PREGNANCY) IN PRIMIGRAVIDA: Status: RESOLVED | Noted: 2017-08-24 | Resolved: 2018-04-02

## 2018-04-02 LAB — BETA HCG QUAL IFA URINE: NEGATIVE

## 2018-04-02 PROCEDURE — 58300 INSERT INTRAUTERINE DEVICE: CPT | Performed by: OBSTETRICS & GYNECOLOGY

## 2018-04-02 PROCEDURE — 99207 ZZC POST PARTUM EXAM: CPT | Performed by: OBSTETRICS & GYNECOLOGY

## 2018-04-02 PROCEDURE — 84703 CHORIONIC GONADOTROPIN ASSAY: CPT | Performed by: OBSTETRICS & GYNECOLOGY

## 2018-04-02 ASSESSMENT — ANXIETY QUESTIONNAIRES
3. WORRYING TOO MUCH ABOUT DIFFERENT THINGS: NOT AT ALL
2. NOT BEING ABLE TO STOP OR CONTROL WORRYING: NOT AT ALL
7. FEELING AFRAID AS IF SOMETHING AWFUL MIGHT HAPPEN: NOT AT ALL
1. FEELING NERVOUS, ANXIOUS, OR ON EDGE: NOT AT ALL
6. BECOMING EASILY ANNOYED OR IRRITABLE: NOT AT ALL
GAD7 TOTAL SCORE: 0
5. BEING SO RESTLESS THAT IT IS HARD TO SIT STILL: NOT AT ALL
IF YOU CHECKED OFF ANY PROBLEMS ON THIS QUESTIONNAIRE, HOW DIFFICULT HAVE THESE PROBLEMS MADE IT FOR YOU TO DO YOUR WORK, TAKE CARE OF THINGS AT HOME, OR GET ALONG WITH OTHER PEOPLE: NOT DIFFICULT AT ALL

## 2018-04-02 ASSESSMENT — PATIENT HEALTH QUESTIONNAIRE - PHQ9: 5. POOR APPETITE OR OVEREATING: NOT AT ALL

## 2018-04-02 NOTE — MR AVS SNAPSHOT
"              After Visit Summary   4/2/2018    Reji [place]    MRN: [id number]           Patient Information     Date Of Birth          1990        Visit Information        Provider Department      4/2/2018 3:10 PM Amita Lundberg DO [facility]        Today's Diagnoses     Postpartum exam    -  1    Encounter for IUD insertion        Presence of intrauterine contraceptive device           Follow-ups after your visit        Follow-up notes from your care team     Return in about 4 weeks (around 4/30/2018).      Who to contact     If you have questions or need follow up information about today's clinic visit or your schedule please contact [facility] [place] directly at [phone].  Normal or non-critical lab and imaging results will be communicated to you by MyChart, letter or phone within 4 business days after the clinic has received the results. If you do not hear from us within 7 days, please contact the clinic through [employer] or phone. If you have a critical or abnormal lab result, we will notify you by phone as soon as possible.  Submit refill requests through [employer] or call your pharmacy and they will forward the refill request to us. Please allow 3 business days for your refill to be completed.          Additional Information About Your Visit        MyChart Information     [employer] gives you secure access to your electronic health record. If you see a primary care provider, you can also send messages to your care team and make appointments. If you have questions, please call your primary care clinic.  If you do not have a primary care provider, please call [phone] and they will assist you.        Care EveryWhere ID     This is your Care EveryWhere ID. This could be used by other organizations to access your [place] medical records  [id number]        Your Vitals Were     Pulse Height Last Period BMI (Body Mass Index)          70 5' 5.5\" (1.664 m) " 05/28/2017 21.8 kg/m2         Blood Pressure from Last 3 Encounters:   04/02/18 112/68   01/30/18 123/82   01/25/18 102/64    Weight from Last 3 Encounters:   04/02/18 133 lb (60.3 kg)   01/28/18 141 lb (64 kg)   01/25/18 143 lb 9.6 oz (65.1 kg)              We Performed the Following     Beta HCG Qual, Urine - [facility] and [place] ([id number])     C KYLEENA IUD 19.5 MG     INSERTION INTRAUTERINE DEVICE     POSTPARTUM CARE VISIT          Today's Medication Changes          These changes are accurate as of 4/2/18  9:02 PM.  If you have any questions, ask your nurse or doctor.               Start taking these medicines.        Dose/Directions    levonorgestrel 19.5 MG IUD   Commonly known as:  KYLEENA   Used for:  Encounter for IUD insertion   Started by:  Amita Lundberg,         Dose:  1 each   1 each (19.5 mg) by Intrauterine route once for 1 dose   Quantity:  1 each   Refills:  0            Where to get your medicines      Some of these will need a paper prescription and others can be bought over the counter.  Ask your nurse if you have questions.     You don't need a prescription for these medications     levonorgestrel 19.5 MG IUD                Primary Care Provider Office Phone # Fax #    [facility] [phone]       [address] [facility] [address]  [facility] [address]        Equal Access to Services     BILL OTERO AH: Hadii senia lopezo Sorocio, waaxda luqadaha, qaybta kaalmada adeegyada, waxay zaid angel ademecca tripathi. So [facility] [phone].    ATENCIÓN: Si habla español, tiene a cheek disposición servicios gratuitos de asistencia lingüística. Llsusannah al [phone].    We comply with applicable federal civil rights laws and [place] laws. We do not discriminate on the basis of race, color, national origin, age, disability, sex, sexual orientation, or gender identity.            Thank you!     Thank you for choosing  [facility] [place]  for your care. Our goal is always to provide you with excellent care. Hearing back from our patients is one way we can continue to improve our services. Please take a few minutes to complete the written survey that you may receive in the mail after your visit with us. Thank you!             Your Updated Medication List - Protect others around you: Learn how to safely use, store and throw away your medicines at www.disposemymeds.org.          This list is accurate as of 4/2/18  9:02 PM.  Always use your most recent med list.                   Brand Name Dispense Instructions for use Diagnosis    levonorgestrel 19.5 MG IUD    KYLEENA    1 each    1 each (19.5 mg) by Intrauterine route once for 1 dose    Encounter for IUD insertion       prenatal multivitamin plus iron 27-0.8 MG Tabs per tablet     100 tablet    Take 1 tablet by mouth daily    Pregnancy test positive

## 2018-04-02 NOTE — PROGRESS NOTES
"  SUBJECTIVE:  Reji Snyder,  is here for a postpartum visit.  She had a Vacuum delivery on 18 delivering a healthy baby boy, named Bilal weighing 6 lbs 2.8 oz at term.      HPI:  Doing well. Mood good.   BF and formula. Wants to discuss contraception. Has never used before. No hx heavy/painful periods.     Had intercourse, pull out.    Last PHQ-9 score on record=   PHQ-9 SCORE 2018   Total Score 0     GAD7 score: 0    Delivery complications:  No  Breast feeding:  Yes, no issues. Uses formula too.  Bladder problems:  No  Bowel problems/hemorrhoids:  No  Episiotomy/laceration/incision healed? Yes: no issues  Vaginal flow:  None  [place]:  Not currently  Contraception: unsure, discuss options  Emotional adjustment:  doing well and happy  Back to work:  2018    12 point review of systems negative other than symptoms noted below.  Constitutional: Weight Gain    OBJECTIVE:  Vitals: /68  Pulse 70  Ht 5' 5.5\" (1.664 m)  Wt 133 lb (60.3 kg)  LMP 2017  BMI 21.8 kg/m2  BMI= Body mass index is 21.8 kg/(m^2).  General - pleasant female in no acute distress.  Breast -  symmetric, no skin changes and no puckering  Abdomen - soft, NT, ND  Pelvic - EG: normal adult female, BUS: within normal limits, Vagina: well rugated, no discharge, Cervix: no lesions or CMT, Uterus: firm, normal sized and nontender, anteverted in position. Adnexae: no masses or tenderness.  Rectovaginal - deferred.    ASSESSMENT:    ICD-10-CM    1. Postpartum exam Z39.2        PLAN:  May resume normal activities without restrictions.  Pap smear was not done today. UTD 2017  Full counseling was provided, and all questions were answered.   Contraceptive counseling performed. Desires IUD. Reviewed progestin containing vs nonhormonal IUDs. Elects Kyleena, which should give lest progestin SE than Mirena. Reviewed insertion and aftercare. Check HCG.  Return to clinic in one year for an annual visit.     Amita Romeo Masters, " DO              INDICATIONS:                                                      Is a pregnancy test required: Yes.  Was it positive or negative?  Negative  Was a consent obtained?  Yes    Reji Snyder is a 28 year old female,   who presents for insertion of an IUD. The risks, benefits and alternatives of IUD insertion were discussed in detail today. She also has reviewed the product brochure.  She has elected to go ahead with the insertion today and her questions were answered. Consent was signed. No period since delivery A pregnancy test was performed today:  Yes    PROCEDURE:                                                      The pelvic exam revealed normal external genitalia. On bimanual exam the uterus was Anteverted and normal in size with no tenderness present. A speculum was inserted into the vagina and the cervix was visualized. The cervix was prepped with Betadine . The anterior lip of the cervix was grasped with a single toothed tenaculum. The uterus sounded to 7 cm. A Kyleena IUD was then inserted without difficulty. The string was cut to 3 cm.  The patient experienced a mild amount of cramping.    POST PROCEDURE:                                                      She  tolerated the procedure well. There were no complications. Patient was discharged in stable condition.    Return to clinic in 5 weeks for IUD check.  Call if severe cramping, fever, abnormal bleeding, abnormal discharge or pelvic pain develop.  Patient was counseled about the chance of irregular bleeding. No sex x 7d, then condoms for 7 days.    Amita Romeo Masters, DO

## 2018-04-03 ASSESSMENT — PATIENT HEALTH QUESTIONNAIRE - PHQ9: SUM OF ALL RESPONSES TO PHQ QUESTIONS 1-9: 0

## 2018-04-03 ASSESSMENT — ANXIETY QUESTIONNAIRES: GAD7 TOTAL SCORE: 0

## 2018-04-30 ENCOUNTER — OFFICE VISIT (OUTPATIENT)
Dept: OBGYN | Facility: CLINIC | Age: 28
End: 2018-04-30
Payer: MEDICAID

## 2018-04-30 VITALS
SYSTOLIC BLOOD PRESSURE: 118 MMHG | WEIGHT: 133 LBS | DIASTOLIC BLOOD PRESSURE: 68 MMHG | HEIGHT: 66 IN | BODY MASS INDEX: 21.38 KG/M2

## 2018-04-30 DIAGNOSIS — Z30.432 ENCOUNTER FOR IUD REMOVAL: ICD-10-CM

## 2018-04-30 DIAGNOSIS — R51.9 NONINTRACTABLE HEADACHE, UNSPECIFIED CHRONICITY PATTERN, UNSPECIFIED HEADACHE TYPE: ICD-10-CM

## 2018-04-30 DIAGNOSIS — N89.8 VAGINAL DISCHARGE: ICD-10-CM

## 2018-04-30 DIAGNOSIS — Z97.5 PRESENCE OF INTRAUTERINE CONTRACEPTIVE DEVICE: Primary | ICD-10-CM

## 2018-04-30 DIAGNOSIS — R35.0 URINARY FREQUENCY: ICD-10-CM

## 2018-04-30 DIAGNOSIS — N93.8 DUB (DYSFUNCTIONAL UTERINE BLEEDING): ICD-10-CM

## 2018-04-30 LAB
ALBUMIN UR-MCNC: NEGATIVE MG/DL
APPEARANCE UR: CLEAR
BACTERIA #/AREA URNS HPF: ABNORMAL /HPF
BILIRUB UR QL STRIP: NEGATIVE
COLOR UR AUTO: YELLOW
GLUCOSE UR STRIP-MCNC: NEGATIVE MG/DL
HGB UR QL STRIP: ABNORMAL
KETONES UR STRIP-MCNC: ABNORMAL MG/DL
LEUKOCYTE ESTERASE UR QL STRIP: ABNORMAL
NITRATE UR QL: NEGATIVE
NON-SQ EPI CELLS #/AREA URNS LPF: ABNORMAL /LPF
PH UR STRIP: 5.5 PH (ref 5–7)
RBC #/AREA URNS AUTO: ABNORMAL /HPF
SOURCE: ABNORMAL
SP GR UR STRIP: 1.01 (ref 1–1.03)
SPECIMEN SOURCE: NORMAL
UROBILINOGEN UR STRIP-ACNC: 0.2 EU/DL (ref 0.2–1)
WBC #/AREA URNS AUTO: ABNORMAL /HPF
WET PREP SPEC: NORMAL

## 2018-04-30 PROCEDURE — 58301 REMOVE INTRAUTERINE DEVICE: CPT | Performed by: OBSTETRICS & GYNECOLOGY

## 2018-04-30 PROCEDURE — 87210 SMEAR WET MOUNT SALINE/INK: CPT | Performed by: OBSTETRICS & GYNECOLOGY

## 2018-04-30 PROCEDURE — 99213 OFFICE O/P EST LOW 20 MIN: CPT | Mod: 25 | Performed by: OBSTETRICS & GYNECOLOGY

## 2018-04-30 PROCEDURE — 81001 URINALYSIS AUTO W/SCOPE: CPT | Performed by: OBSTETRICS & GYNECOLOGY

## 2018-04-30 RX ORDER — ACETAMINOPHEN AND CODEINE PHOSPHATE 120; 12 MG/5ML; MG/5ML
1 SOLUTION ORAL DAILY
Qty: 84 TABLET | Refills: 4 | Status: SHIPPED | OUTPATIENT
Start: 2018-04-30 | End: 2021-03-11

## 2018-04-30 NOTE — MR AVS SNAPSHOT
After Visit Summary   4/30/2018    Reji Snyder    MRN: [id number]           Patient Information     Date Of Birth          1990        Visit Information        Provider Department      4/30/2018 2:10 PM Amita Lundberg DO [facility]        Today's Diagnoses     Presence of intrauterine contraceptive device    -  1    Nonintractable headache, unspecified chronicity pattern, unspecified headache type        DUB (dysfunctional uterine bleeding)        Urinary frequency        Vaginal discharge        Encounter for IUD removal           Follow-ups after your visit        Follow-up notes from your care team     Return if symptoms worsen or fail to improve.      Who to contact     If you have questions or need follow up information about today's clinic visit or your schedule please contact [facility] directly at [phone].  Normal or non-critical lab and imaging results will be communicated to you by MyChart, letter or phone within 4 business days after the clinic has received the results. If you do not hear from us within 7 days, please contact the clinic through MyChart or phone. If you have a critical or abnormal lab result, we will notify you by phone as soon as possible.  Submit refill requests through "[employer] or call your pharmacy and they will forward the refill request to us. Please allow 3 business days for your refill to be completed.          Additional Information About Your Visit        MyChart Information     "[employer] gives you secure access to your electronic health record. If you see a primary care provider, you can also send messages to your care team and make appointments. If you have questions, please call your primary care clinic.  If you do not have a primary care provider, please call [phone] and they will assist you.        Care EveryWhere ID     This is your Care EveryWhere ID. This could be used by other organizations to  "access your [facility] medical records  [id number]        Your Vitals Were     Height BMI (Body Mass Index)                5' 5.5\" (1.664 m) 21.8 kg/m2           Blood Pressure from Last 3 Encounters:   04/30/18 118/68   04/02/18 112/68   01/30/18 123/82    Weight from Last 3 Encounters:   04/30/18 133 lb (60.3 kg)   04/02/18 133 lb (60.3 kg)   01/28/18 141 lb (64 kg)              We Performed the Following     REMOVE INTRAUTERINE DEVICE     UA reflex to Microscopic     Urine Microscopic     Wet prep          Today's Medication Changes          These changes are accurate as of 4/30/18 11:59 PM.  If you have any questions, ask your nurse or doctor.               Start taking these medicines.        Dose/Directions    norethindrone 0.35 MG per tablet   Commonly known as:  MICRONOR   Used for:  Nonintractable headache, unspecified chronicity pattern, unspecified headache type, DUB (dysfunctional uterine bleeding)   Started by:  Amita Lundberg,         Dose:  1 tablet   Take 1 tablet (0.35 mg) by mouth daily   Quantity:  84 tablet   Refills:  4            Where to get your medicines      These medications were sent to [id number] - [place], [place] - [address] AT [address]  [address] [place] [address]     Phone:  [phone]     norethindrone 0.35 MG per tablet                Primary Care Provider Office Phone # Fax #    [facility] [phone]       [address]  [facility] [address]        Equal Access to Services     BILL OTERO AH: Hadii senia Clements, waaxda luqadaha, qaybta kaalmacristino smith, erica tripathi. So [facility] [phone].    ATENCIÓN: Si habla español, tiene a cheek disposición servicios gratuitos de asistencia lingüística. Llame al [phone].    We comply with applicable federal civil rights laws and " [place] laws. We do not discriminate on the basis of race, color, national origin, age, disability, sex, sexual orientation, or gender identity.            Thank you!     Thank you for choosing [facility]  for your care. Our goal is always to provide you with excellent care. Hearing back from our patients is one way we can continue to improve our services. Please take a few minutes to complete the written survey that you may receive in the mail after your visit with us. Thank you!             Your Updated Medication List - Protect others around you: Learn how to safely use, store and throw away your medicines at www.disposemymeds.org.          This list is accurate as of 4/30/18 11:59 PM.  Always use your most recent med list.                   Brand Name Dispense Instructions for use Diagnosis    levonorgestrel 19.5 MG IUD    KYLEENA    1 each    1 each (19.5 mg) by Intrauterine route once for 1 dose    Encounter for IUD insertion       norethindrone 0.35 MG per tablet    MICRONOR    84 tablet    Take 1 tablet (0.35 mg) by mouth daily    Nonintractable headache, unspecified chronicity pattern, unspecified headache type, DUB (dysfunctional uterine bleeding)       prenatal multivitamin plus iron 27-0.8 MG Tabs per tablet     100 tablet    Take 1 tablet by mouth daily    Pregnancy test positive

## 2018-04-30 NOTE — PROGRESS NOTES
SUBJECTIVE:                                                   Reji Snyder is a 28 year old female who presents to clinic today for the following health issue(s):  Patient presents with:  IUD: patient would like IUD removed, she c/o irregular light bleeding as well as headaches and discomfort. patient had Kyleena placed 18. patient is unsure about getting on a different birthcontrol method      HPI:  Having headaches, taking ibuprof/tylenol. Feels uncomfortable, more urination, discharge, pain after urinates. Wants IUD removed. Wants to do pills. Having some spotting/bleeding that is bothersome.     No LMP recorded. Patient is not currently having periods (Reason: IUD)..   Patient is sexually active, .  Using IUD for contraception.    reports that she has never smoked. She has never used smokeless tobacco.    STD testing offered?  Declined    Health maintenance updated:  yes    Today's PHQ-2 Score:   PHQ-2 (  [employer]) 6/15/2017   Q1: Little interest or pleasure in doing things 0   Q2: Feeling down, depressed or hopeless 0   PHQ-2 Score 0     Today's PHQ-9 Score:   PHQ-9 SCORE 2018   Total Score 0     Today's MARITZA-7 Score:   MARITZA-7 SCORE 2018   Total Score 0       Problem list and histories reviewed & adjusted, as indicated.  Additional history: as documented.    Patient Active Problem List   Diagnosis     Dysmenorrhea     CARDIOVASCULAR SCREENING; LDL GOAL LESS THAN 160     Esophageal reflux     Seasonal allergic rhinitis     Vitamin D deficiency     Cystic acne     ASCUS favor benign     Indication for care in labor and delivery, antepartum     Labor and delivery, indication for care     Pregnancy     Presence of intrauterine contraceptive device     Past Surgical History:   Procedure Laterality Date     ------------OTHER-------------      Female Circumcision      Social History   Substance Use Topics     Smoking status: Never Smoker     Smokeless tobacco: Never Used     Alcohol use No       "   Negative family history of: Breast Cancer, Cancer - colorectal, DIABETES, C.A.D.            Current Outpatient Prescriptions   Medication Sig     levonorgestrel (KYLEENA) 19.5 MG IUD 1 each (19.5 mg) by Intrauterine route once for 1 dose     norethindrone (MICRONOR) 0.35 MG per tablet Take 1 tablet (0.35 mg) by mouth daily     Prenatal Vit-Fe Fumarate-FA (PRENATAL MULTIVITAMIN  PLUS IRON) 27-0.8 MG TABS per tablet Take 1 tablet by mouth daily     No current facility-administered medications for this visit.      Facility-Administered Medications Ordered in Other Visits   Medication     lidocaine-EPINEPHrine 1.5 %-[id number] injection     No Known Allergies    ROS:  12 point review of systems negative other than symptoms noted below.  Constitutional: Weight Loss  Genitourinary: Irregular Menses  Neurologic: Headaches    OBJECTIVE:     /68  Ht 5' 5.5\" (1.664 m)  Wt 133 lb (60.3 kg)  BMI 21.8 kg/m2  Body mass index is 21.8 kg/(m^2).    Exam:  Constitutional:  Appearance: Well nourished, well developed alert, in no acute distress  Chest:  Respiratory Effort:  Breathing unlabored  Lymphatic: Lymph Nodes:  No other lymphadenopathy present  Skin:General Inspection:  No rashes present, no lesions present, no areas of discoloration; Genitalia and Groin:  No rashes present, no lesions present, no areas of discoloration, no masses present.  Neurologic/Psychiatric:  Mental Status:  Oriented X3   Pelvic Exam:  External Genitalia:     Normal appearance for age, no discharge present, no tenderness present, no inflammatory lesions present, color normal  Vagina:    Normal vaginal vault without central or paravaginal defects, no discharge present, no inflammatory lesions present, no masses present  Bladder:     Nontender to palpation  Urethra:   Urethral Body:  Urethra palpation normal, urethra structural support normal   Urethral Meatus:  No erythema or lesions present  Cervix:     Appearance healthy, no lesions present, " nontender to palpation, no bleeding present, string present  Uterus:     Nontender to palpation, no masses present, position anteflexed, mobility: normal  Adnexa:     No adnexal tenderness present, no adnexal masses present  Perineum:     Perineum within normal limits, no evidence of trauma, no rashes or skin lesions present  Anus:     Anus within normal limits, no hemorrhoids present  Inguinal Lymph Nodes:     No lymphadenopathy present  Pubic Hair:     Normal pubic hair distribution for age  Genitalia and Groin:     No rashes present, no lesions present, no areas of discoloration, no masses present       In-Clinic Test Results:  No results found for this or any previous visit (from the past 24 hour(s)).    ASSESSMENT/PLAN:                                                        ICD-10-CM    1. Presence of intrauterine contraceptive device Z97.5 REMOVE INTRAUTERINE DEVICE   2. Nonintractable headache, unspecified chronicity pattern, unspecified headache type R51 norethindrone (MICRONOR) 0.35 MG per tablet   3. DUB (dysfunctional uterine bleeding) N93.8 norethindrone (MICRONOR) 0.35 MG per tablet     REMOVE INTRAUTERINE DEVICE   4. Urinary frequency R35.0 UA reflex to Microscopic     Wet prep   5. Vaginal discharge N89.8 UA reflex to Microscopic     Wet prep   6. Encounter for IUD removal Z30.432        There are no Patient Instructions on file for this visit.    -Reviewed expected and previously discussed SE and bleeding profile for IUD. As she has only had IUD for 28d, would not be surprised by what she is experiencing, but would expect it to resolve. Pt is not interested in trying to find other causes, save the IUD or fix the symptoms, wants IUD removed. Wants to use pills. Is BF.   IUD removed without issues. Discussed increased bleeding which may occur.   Rx progesterone only pills. Discussed efficacy and combined effect with BF. Discussed that if weaning/stop BF would rec changing to another form contraception  (eg COCs). Pt v/u. Again discussed DUB which may occur. Is to take at same time every day. Efficacy less than IUD.   -Has return to work note, would like clearance to go back.  -Wet prep obtained, will notify if positive  -UA obtained, will notify if positive.       Amita Lundberg,   [facility]      INDICATIONS:                                                      Is a pregnancy test required: No.  Was a consent obtained?  Yes    Reji Snyder is a 28 year old female,, No LMP recorded. Patient is not currently having periods (Reason: IUD). who presents today for IUD removal. Her current IUD was placed about a month ago. She has had problems including irregular bleeding and discomfort with the IUD. She requests removal of the IUD because of problems stated above    Today's PHQ-2 Score:   PHQ-2 (  [employer]) 6/15/2017   Q1: Little interest or pleasure in doing things 0   Q2: Feeling down, depressed or hopeless 0   PHQ-2 Score 0       PROCEDURE:                                                      A speculum exam was performed and the cervix was visualized. The IUD string was visualized. Using ring forceps, the string  was grasped and the IUD removed intact.    POST PROCEDURE:                                                      The patient tolerated the procedure well. Patient was discharged in stable condition.    Call if bleeding, pain or fever occur. and Birth control counseling given.    Amita Lundberg, DO

## 2018-06-21 ENCOUNTER — OFFICE VISIT (OUTPATIENT)
Dept: FAMILY MEDICINE | Facility: CLINIC | Age: 28
End: 2018-06-21
Payer: COMMERCIAL

## 2018-06-21 VITALS
TEMPERATURE: 97.7 F | BODY MASS INDEX: 22.12 KG/M2 | WEIGHT: 135 LBS | SYSTOLIC BLOOD PRESSURE: 110 MMHG | DIASTOLIC BLOOD PRESSURE: 70 MMHG | HEART RATE: 80 BPM

## 2018-06-21 DIAGNOSIS — J01.00 ACUTE NON-RECURRENT MAXILLARY SINUSITIS: Primary | ICD-10-CM

## 2018-06-21 PROCEDURE — 99213 OFFICE O/P EST LOW 20 MIN: CPT | Performed by: INTERNAL MEDICINE

## 2018-06-21 NOTE — PATIENT INSTRUCTIONS
Try ibuprofen as needed for the headache    Use mao med sinus rinse 1-2 times per day followed by Flonase nasal spray.     Can take cetirizine daily as well (Zyrtec) - allergy medication    If symptoms do not improve over the next 2-3 days, okay to fill antibiotics.

## 2018-06-21 NOTE — PROGRESS NOTES
SUBJECTIVE:   Reji Snyder is a 28 year old female who presents to clinic today for the following health issues:      Acute Illness   Acute illness concerns: sinus congestion, headache, slight dizziness   Onset: a week ago     Fever: no    Chills/Sweats: no    Headache (location?): YES- left side primarily     Sinus Pressure:YES    Conjunctivitis:  no    Ear Pain: YES: both    Rhinorrhea: no    Congestion: YES    Sore Throat: no     Cough: no    Wheeze: no    Decreased Appetite: no    Nausea: no    Vomiting: no    Diarrhea:  no    Dysuria/Freq.: no    Fatigue/Achiness: no    Sick/Strep Exposure: no     Therapies Tried and outcome: tylenol     Reji is here with concern for sinus infection.  She reports a week of mainly left sided headache and ear pain. Nose feels blocked, but not a lot of mucous.  Symptoms feel like they are getting worse.  She has tried just tylenol, breastfeeding so wasn't sure what she could take.         Reviewed and updated as needed this visit by clinical staff  Tobacco  Allergies  Meds       Reviewed and updated as needed this visit by Provider         ROS:  Constitutional, HEENT, cardiovascular, pulmonary, gi and gu systems are negative, except as otherwise noted.    OBJECTIVE:     /70 (BP Location: Left arm, Patient Position: Chair, Cuff Size: Adult Regular)  Pulse 80  Temp 97.7  F (36.5  C) (Tympanic)  Wt 135 lb (61.2 kg)  LMP 06/18/2018  Breastfeeding? Yes  BMI 22.12 kg/m2  Body mass index is 22.12 kg/(m^2).    Gen: well appearing, pleasant woman, no distress  HEENT: PERRL, no conjunctival injection, no posterior pharynx erythema, MMM.  TM normal b/l.  + left sided frontal and maxillary sinus tenderness.   Neck: supple, no LAD  Pulm: breathing comfortably, CTAB, no wheezes or rales  CV: RRR, normal S1 and S2, no murmurs      Diagnostic Test Results:  none     ASSESSMENT/PLAN:       1. Acute non-recurrent maxillary sinusitis  Likely viral URI and headache.  Recommended  trying NSAIDs, nasal saline irrigation, and nasal steroid spray.  If symptoms not improving in 2-3 days, can fill printed abx rx.    - amoxicillin-clavulanate (AUGMENTIN) 875-125 MG per tablet; Take 1 tablet by mouth 2 times daily for 7 days  Dispense: 14 tablet; Refill: 0    F/U as needed for persistent or worsening symptoms.         Jennifer Esparza MD  [facility]

## 2018-06-21 NOTE — MR AVS SNAPSHOT
After Visit Summary   6/21/2018    Reji Snyder    MRN: [id number]           Patient Information     Date Of Birth          1990        Visit Information        Provider Department      6/21/2018 12:40 PM Jennifer Esparza MD [facility] [place]        Today's Diagnoses     Acute non-recurrent maxillary sinusitis    -  1      Care Instructions    Try ibuprofen as needed for the headache    Use mao med sinus rinse 1-2 times per day followed by Flonase nasal spray.     Can take cetirizine daily as well (Zyrtec) - allergy medication    If symptoms do not improve over the next 2-3 days, okay to fill antibiotics.           Follow-ups after your visit        Who to contact     If you have questions or need follow up information about today's clinic visit or your schedule please contact [facility] [place] directly at [phone].  Normal or non-critical lab and imaging results will be communicated to you by MyChart, letter or phone within 4 business days after the clinic has received the results. If you do not hear from us within 7 days, please contact the clinic through Quick Keyhart or phone. If you have a critical or abnormal lab result, we will notify you by phone as soon as possible.  Submit refill requests through Viveve or call your pharmacy and they will forward the refill request to us. Please allow 3 business days for your refill to be completed.          Additional Information About Your Visit        MyChart Information     Viveve gives you secure access to your electronic health record. If you see a primary care provider, you can also send messages to your care team and make appointments. If you have questions, please call your primary care clinic.  If you do not have a primary care provider, please call [phone] and they will assist you.        Care EveryWhere ID     This is your Care EveryWhere ID. This could be used by other organizations to access your [place]  medical records  [id number]        Your Vitals Were     Pulse Temperature Last Period Breastfeeding? BMI (Body Mass Index)       80 97.7  F (36.5  C) (Tympanic) 06/18/2018 Yes 22.12 kg/m2        Blood Pressure from Last 3 Encounters:   06/21/18 110/70   04/30/18 118/68   04/02/18 112/68    Weight from Last 3 Encounters:   06/21/18 135 lb (61.2 kg)   04/30/18 133 lb (60.3 kg)   04/02/18 133 lb (60.3 kg)              Today, you had the following     No orders found for display         Today's Medication Changes          These changes are accurate as of 6/21/18  1:02 PM.  If you have any questions, ask your nurse or doctor.               Start taking these medicines.        Dose/Directions    amoxicillin-clavulanate 875-125 MG per tablet   Commonly known as:  AUGMENTIN   Used for:  Acute non-recurrent maxillary sinusitis   Started by:  Jennifer Esparza MD        Dose:  1 tablet   Take 1 tablet by mouth 2 times daily for 7 days   Quantity:  14 tablet   Refills:  0            Where to get your medicines      Some of these will need a paper prescription and others can be bought over the counter.  Ask your nurse if you have questions.     Bring a paper prescription for each of these medications     amoxicillin-clavulanate 875-125 MG per tablet                Primary Care Provider Office Phone # Fax #    [facility] [phone]       [address] [facility]  [facility] [address]        Equal Access to Services     BILL OTERO AH: Hadii senia ku hadasho Soomaali, waaxda luqadaha, qaybta kaalmada adeegyada, waxay zaid tripathi. So [facility] [phone].    ATENCIÓN: Si habla español, tiene a cheek disposición servicios gratuitos de asistencia lingüística. Llame al [phone].    We comply with applicable federal civil rights laws and [place] laws. We do not discriminate on the basis of race, color, national origin, age,  disability, sex, sexual orientation, or gender identity.            Thank you!     Thank you for choosing [facility] [place]  for your care. Our goal is always to provide you with excellent care. Hearing back from our patients is one way we can continue to improve our services. Please take a few minutes to complete the written survey that you may receive in the mail after your visit with us. Thank you!             Your Updated Medication List - Protect others around you: Learn how to safely use, store and throw away your medicines at www.disposemymeds.org.          This list is accurate as of 6/21/18  1:02 PM.  Always use your most recent med list.                   Brand Name Dispense Instructions for use Diagnosis    amoxicillin-clavulanate 875-125 MG per tablet    AUGMENTIN    14 tablet    Take 1 tablet by mouth 2 times daily for 7 days    Acute non-recurrent maxillary sinusitis       levonorgestrel 19.5 MG IUD    KYLEENA    1 each    1 each (19.5 mg) by Intrauterine route once for 1 dose    Encounter for IUD insertion       norethindrone 0.35 MG per tablet    MICRONOR    84 tablet    Take 1 tablet (0.35 mg) by mouth daily    Nonintractable headache, unspecified chronicity pattern, unspecified headache type, DUB (dysfunctional uterine bleeding)       prenatal multivitamin plus iron 27-0.8 MG Tabs per tablet     100 tablet    Take 1 tablet by mouth daily    Pregnancy test positive

## 2018-06-22 PROBLEM — Z34.90 PREGNANCY: Status: RESOLVED | Noted: 2018-01-28 | Resolved: 2018-06-22

## 2018-07-09 DIAGNOSIS — Z32.01 PREGNANCY TEST POSITIVE: ICD-10-CM

## 2018-07-09 NOTE — TELEPHONE ENCOUNTER
Prenatal Vit-Fe Fumarate-FA      Last Written Prescription Date:  06/15/2017  Last Fill Quantity: 100 tablet,   # refills: 3  Last Office Visit: 06/21/2018  Future Office visit:       Routing refill request to provider for review/approval because:  Drug not on the [facility], [facility] or [facility] refill protocol or controlled substance

## 2018-07-10 RX ORDER — PRENATAL VIT/IRON FUM/FOLIC AC 27MG-0.8MG
TABLET ORAL
Qty: 100 TABLET | Refills: 0 | Status: SHIPPED | OUTPATIENT
Start: 2018-07-10 | End: 2018-12-02

## 2018-10-29 ENCOUNTER — HEALTH MAINTENANCE LETTER (OUTPATIENT)
Age: 28
End: 2018-10-29

## 2018-11-13 ENCOUNTER — OFFICE VISIT (OUTPATIENT)
Dept: FAMILY MEDICINE | Facility: CLINIC | Age: 28
End: 2018-11-13
Payer: COMMERCIAL

## 2018-11-13 VITALS
TEMPERATURE: 98.4 F | SYSTOLIC BLOOD PRESSURE: 106 MMHG | WEIGHT: 137 LBS | HEART RATE: 100 BPM | BODY MASS INDEX: 22.45 KG/M2 | DIASTOLIC BLOOD PRESSURE: 60 MMHG

## 2018-11-13 DIAGNOSIS — J20.9 ACUTE BRONCHITIS WITH SYMPTOMS > 10 DAYS: Primary | ICD-10-CM

## 2018-11-13 PROCEDURE — 99213 OFFICE O/P EST LOW 20 MIN: CPT | Performed by: FAMILY MEDICINE

## 2018-11-13 RX ORDER — ALBUTEROL SULFATE 90 UG/1
2 AEROSOL, METERED RESPIRATORY (INHALATION) EVERY 6 HOURS PRN
Qty: 1 INHALER | Refills: 0 | Status: SHIPPED | OUTPATIENT
Start: 2018-11-13 | End: 2021-03-11

## 2018-11-13 RX ORDER — AZITHROMYCIN 250 MG/1
TABLET, FILM COATED ORAL
Qty: 6 TABLET | Refills: 0 | Status: SHIPPED | OUTPATIENT
Start: 2018-11-13 | End: 2018-12-19

## 2018-11-13 NOTE — PROGRESS NOTES
SUBJECTIVE:   Reji Snyder is a 28 year old female who presents to clinic today for the following health issues:      Acute Illness   Acute illness concerns: cough  Onset: 2 weeks     Fever: no    Chills/Sweats: no    Headache (location?): YES    Sinus Pressure:no    Conjunctivitis:  no    Ear Pain: no    Rhinorrhea: no    Congestion: YES    Sore Throat: YES     Cough: YES    Wheeze: no    Decreased Appetite: no    Nausea: no    Vomiting: no    Diarrhea:  no    Dysuria/Freq.: no    Fatigue/Achiness: no    Sick/Strep Exposure: YES- child with cold     Therapies Tried and outcome: NONE          Problem list and histories reviewed & adjusted, as indicated.  Additional history: as documented        Reviewed and updated as needed this visit by clinical staff  Tobacco  Allergies  Meds       Reviewed and updated as needed this visit by Provider         ROS:  CONSTITUTIONAL: NEGATIVE for fever, chills, change in weight  ENT/MOUTH: NEGATIVE for ear, mouth and throat problems  RESP:POSITIVE for cough-non productive  CV: NEGATIVE for chest pain, palpitations or peripheral edema    OBJECTIVE:                                                    /60  Pulse 100  Temp 98.4  F (36.9  C) (Tympanic)  Wt 137 lb (62.1 kg)  Breastfeeding? Yes  BMI 22.45 kg/m2  Body mass index is 22.45 kg/(m^2).   GENERAL: healthy, alert, well nourished, well hydrated, no distress  HENT: ear canals- normal; TMs- normal; Nose- normal; Mouth- no ulcers, no lesions  NECK: no tenderness, no adenopathy, no asymmetry, no masses, no stiffness; thyroid- normal to palpation  RESP: lungs clear to auscultation - no rales, no rhonchi, no wheezes  CV: regular rates and rhythm, normal S1 S2, no S3 or S4 and no murmur, no click or rub -         ASSESSMENT/PLAN:                                                        ICD-10-CM    1. Acute bronchitis with symptoms > 10 days J20.9 azithromycin (ZITHROMAX) 250 MG tablet     albuterol (PROAIR HFA/PROVENTIL  HFA/VENTOLIN HFA) 108 (90 Base) MCG/ACT inhaler     Patient has upper respiratory symptoms most likely bronchitis.  Suggested azithromycin along with albuterol inhaler.  Follow-up if symptoms are not getting better.  Nikos Lilly MD  [facility]

## 2018-11-13 NOTE — MR AVS SNAPSHOT
After Visit Summary   11/13/2018    Reji Snyder    MRN: [id number]           Patient Information     Date Of Birth          1990        Visit Information        Provider Department      11/13/2018 1:00 PM Nikos Lilly MD [facility] [place]        Today's Diagnoses     Acute bronchitis with symptoms > 10 days    -  1       Follow-ups after your visit        Follow-up notes from your care team     Return in about 1 week (around 11/20/2018) for if symptom does not get better.      Who to contact     If you have questions or need follow up information about today's clinic visit or your schedule please contact [facility] [place] directly at [phone].  Normal or non-critical lab and imaging results will be communicated to you by MyChart, letter or phone within 4 business days after the clinic has received the results. If you do not hear from us within 7 days, please contact the clinic through [employer] System[employer]art or phone. If you have a critical or abnormal lab result, we will notify you by phone as soon as possible.  Submit refill requests through WiFi Rail or call your pharmacy and they will forward the refill request to us. Please allow 3 business days for your refill to be completed.          Additional Information About Your Visit        MyChart Information     WiFi Rail gives you secure access to your electronic health record. If you see a primary care provider, you can also send messages to your care team and make appointments. If you have questions, please call your primary care clinic.  If you do not have a primary care provider, please call [phone] and they will assist you.        Care EveryWhere ID     This is your Care EveryWhere ID. This could be used by other organizations to access your [place] medical records  [id number]        Your Vitals Were     Pulse Temperature Breastfeeding? BMI (Body Mass Index)          100 98.4  F (36.9  C) (Tympanic) Yes 22.45 kg/m2          Blood Pressure from Last 3 Encounters:   11/13/18 106/60   06/21/18 110/70   04/30/18 118/68    Weight from Last 3 Encounters:   11/13/18 137 lb (62.1 kg)   06/21/18 135 lb (61.2 kg)   04/30/18 133 lb (60.3 kg)              Today, you had the following     No orders found for display         Today's Medication Changes          These changes are accurate as of 11/13/18 11:59 PM.  If you have any questions, ask your nurse or doctor.               Start taking these medicines.        Dose/Directions    albuterol 108 (90 Base) MCG/ACT inhaler   Commonly known as:  PROAIR HFA/PROVENTIL HFA/VENTOLIN HFA   Used for:  Acute bronchitis with symptoms > 10 days   Started by:  Nikos Lilly MD        Dose:  2 puff   Inhale 2 puffs into the lungs every 6 hours as needed for shortness of breath / dyspnea or wheezing   Quantity:  1 Inhaler   Refills:  0       azithromycin 250 MG tablet   Commonly known as:  ZITHROMAX   Used for:  Acute bronchitis with symptoms > 10 days   Started by:  Nikos Lilly MD        Two tablets first day, then one tablet daily for four days.   Quantity:  6 tablet   Refills:  0            Where to get your medicines      These medications were sent to [id number] - [place], [place] - [address] AT [address]  [address] [place] [address] [address]     Phone:  [phone]     albuterol 108 (90 Base) MCG/ACT inhaler    azithromycin 250 MG tablet                Primary Care Provider Office Phone # Fax #    [facility] [phone]       [address] [facility]  [facility] [address]        Equal Access to Services     BILL OTERO AH: Jeremias Clements, coy mcpherson, qaparker kaalmada sarah, erica tripathi. [facility] [phone].    ATENCIÓN: Si habla español, tiene a cheek disposición servicios gratuitos de asistencia lingüística. Llame al  [phone].    We comply with applicable federal civil rights laws and [place] laws. We do not discriminate on the basis of race, color, national origin, age, disability, sex, sexual orientation, or gender identity.            Thank you!     Thank you for choosing [facility] [place]  for your care. Our goal is always to provide you with excellent care. Hearing back from our patients is one way we can continue to improve our services. Please take a few minutes to complete the written survey that you may receive in the mail after your visit with us. Thank you!             Your Updated Medication List - Protect others around you: Learn how to safely use, store and throw away your medicines at www.disposemymeds.org.          This list is accurate as of 11/13/18 11:59 PM.  Always use your most recent med list.                   Brand Name Dispense Instructions for use Diagnosis    albuterol 108 (90 Base) MCG/ACT inhaler    PROAIR HFA/PROVENTIL HFA/VENTOLIN HFA    1 Inhaler    Inhale 2 puffs into the lungs every 6 hours as needed for shortness of breath / dyspnea or wheezing    Acute bronchitis with symptoms > 10 days       azithromycin 250 MG tablet    ZITHROMAX    6 tablet    Two tablets first day, then one tablet daily for four days.    Acute bronchitis with symptoms > 10 days       levonorgestrel 19.5 MG IUD    KYLEENA    1 each    1 each (19.5 mg) by Intrauterine route once for 1 dose    Encounter for IUD insertion       norethindrone 0.35 MG per tablet    MICRONOR    84 tablet    Take 1 tablet (0.35 mg) by mouth daily    Nonintractable headache, unspecified chronicity pattern, unspecified headache type, DUB (dysfunctional uterine bleeding)       prenatal multivitamin plus iron 27-0.8 MG Tabs per tablet     100 tablet    TAKE ONE TABLET BY MOUTH EVERY DAY    Pregnancy test positive

## 2018-12-02 DIAGNOSIS — Z32.01 PREGNANCY TEST POSITIVE: ICD-10-CM

## 2018-12-03 RX ORDER — PRENATAL VIT/IRON FUM/FOLIC AC 27MG-0.8MG
TABLET ORAL
Qty: 100 TABLET | Refills: 0 | Status: SHIPPED | OUTPATIENT
Start: 2018-12-03 | End: 2019-04-23

## 2018-12-03 NOTE — TELEPHONE ENCOUNTER
Prenatal       Last Written Prescription Date:  7/10/18  Last Fill Quantity: 100,   # refills: 0  Last Office Visit: 11/13/18  Future Office visit:       Routing refill request to provider for review/approval because:  Drug not on the [facility], [facility] or [facility] refill protocol or controlled substance    Leona Martinez CMA

## 2018-12-19 ENCOUNTER — OFFICE VISIT (OUTPATIENT)
Dept: FAMILY MEDICINE | Facility: CLINIC | Age: 28
End: 2018-12-19
Payer: COMMERCIAL

## 2018-12-19 VITALS
TEMPERATURE: 98 F | OXYGEN SATURATION: 99 % | BODY MASS INDEX: 22.29 KG/M2 | HEART RATE: 88 BPM | SYSTOLIC BLOOD PRESSURE: 110 MMHG | WEIGHT: 136 LBS | DIASTOLIC BLOOD PRESSURE: 62 MMHG

## 2018-12-19 DIAGNOSIS — H66.001 ACUTE SUPPURATIVE OTITIS MEDIA OF RIGHT EAR WITHOUT SPONTANEOUS RUPTURE OF TYMPANIC MEMBRANE, RECURRENCE NOT SPECIFIED: Primary | ICD-10-CM

## 2018-12-19 DIAGNOSIS — J01.00 ACUTE NON-RECURRENT MAXILLARY SINUSITIS: ICD-10-CM

## 2018-12-19 PROCEDURE — 99213 OFFICE O/P EST LOW 20 MIN: CPT | Performed by: NURSE PRACTITIONER

## 2018-12-19 NOTE — PATIENT INSTRUCTIONS
1. Take Augmentin twice a day for seven days. Take with food. Take a probiotic or eat yogurt with active cultures.   2. Let me know next week if you're no better.    VIRAL UPPER RESPIRATORY INFECTION SUPPORTIVE CARES:  Stay hydrated (even if you're not thirsty)  Over-the-counter decongestants for sinus and nasal congestion  Warm compresses over sinuses, or allow warm shower water to run down face  Neti-Pot sinus / nasal rinse  Flonase or other intranasal steroid MAY help  Humidifier at night  Tylenol or ibuprofen for fever and aches & pains  Salt water gargles, Chloraseptic spray, or Cepacol lozenges for sore throat  Over-the-counter cough suppressants, cough drops, or honey for cough  Call or return to the clinic if you are getting worse or not getting better in the coming days  Go to the Emergency Room if you have trouble breathing or if your fever gets really high

## 2018-12-19 NOTE — PROGRESS NOTES
SUBJECTIVE:                                                      Reji Snyder is a 28 year old female who presents to clinic today for the following health issues:    Acute Illness   Acute illness concerns: sinus congestion   Onset: a week     Fever: YES- subjective     Chills/Sweats: no    Headache (location?): YES    Sinus Pressure:no    Conjunctivitis:  no    Ear Pain: YES: both    Rhinorrhea: YES    Congestion: YES    Sore Throat: no, slight this morning      Cough: YES    Wheeze: no    Decreased Appetite: no    Nausea: no    Vomiting: no    Diarrhea:  no    Dysuria/Freq.: no    Fatigue/Achiness: no    Sick/Strep Exposure: no     Therapies Tried and outcome: tylenol     HPI: Was treated for bronchitis in November. Fully recovered. One week ago, she began experiencing symptoms of URI (headache, subjective fever, ear pain, runny nose, sinus congestion, and cough). Her most pervasive symptom is right ear pain and fullness. Denies wheeze, nausea, vomiting, rash, myalgias. Has tried Tylenol, with little benefit noted.    Problem list and histories reviewed & adjusted, as indicated.  Additional history: as documented    Reviewed and updated as needed this visit by clinical staff  Tobacco  Allergies  Meds  Problems  Med Hx  Surg Hx  Fam Hx       Reviewed and updated as needed this visit by Provider  Tobacco  Allergies  Meds  Problems  Med Hx  Surg Hx  Fam Hx         ROS:  Constitutional, HEENT, pulmonary, GI, musculoskeletal, skin systems are negative, except as otherwise noted.    OBJECTIVE:                                                      /62 (BP Location: Right arm, Patient Position: Chair, Cuff Size: Adult Regular)   Pulse 88   Temp 98  F (36.7  C) (Tympanic)   Wt 61.7 kg (136 lb)   LMP 12/17/2018   SpO2 99%   Breastfeeding? Yes   BMI 22.29 kg/m   Body mass index is 22.29 kg/m .   GENERAL: healthy, alert, well nourished, well hydrated, no distress  HENT: ear canals- normal; Right TM  with erythema, bulge, and purulent fluid behind; Left TM normal. Nose- yellow rhinorrhea; Mouth- mildly erythematous posterior oropharynx, but without edema or exudate noted  NECK: tender adenopathy bilaterally in anterior cervical chain, no asymmetry, no masses, no stiffness; thyroid- normal to palpation  RESP: lungs clear to auscultation - no rales, no rhonchi, no wheezes  CV: regular rates and rhythm, normal S1 S2, no S3 or S4 and no murmur, no click or rub -    Diagnostic test results:  none     ASSESSMENT/PLAN:                                                      Reji was seen today for nasal congestion and ear pain. History and exam suggest suppurative AOM with concomitant acute bacterial sinusitis. Will treat with Augmentin for 7 days. Symptomatic cares also outlined. Discussed reasons to call or return to clinic. Reji acknowledges and demonstrates understanding of circumstances under which care should be sought urgently or emergently. Follow up as discussed. Discussed risks, benefits, alternatives, and potential side effects of new medication / treatment. Agrees with plan of care. All questions answered.      Diagnoses and all orders for this visit:    Acute suppurative otitis media of right ear without spontaneous rupture of tympanic membrane, recurrence not specified  -     amoxicillin-clavulanate (AUGMENTIN) 875-125 MG tablet; Take 1 tablet by mouth 2 times daily for 7 days    Acute non-recurrent maxillary sinusitis  -     amoxicillin-clavulanate (AUGMENTIN) 875-125 MG tablet; Take 1 tablet by mouth 2 times daily for 7 days      Risks, benefits and alternatives of treatments discussed. Plan agreed upon and all questions answered.      Follow-Up: Return in about 8 days (around 12/27/2018).    See Patient Instructions      Zaheer Peralta, DEJAN, CNP

## 2019-02-06 ENCOUNTER — OFFICE VISIT (OUTPATIENT)
Dept: FAMILY MEDICINE | Facility: CLINIC | Age: 29
End: 2019-02-06
Payer: COMMERCIAL

## 2019-02-06 ENCOUNTER — TELEPHONE (OUTPATIENT)
Dept: FAMILY MEDICINE | Facility: CLINIC | Age: 29
End: 2019-02-06

## 2019-02-06 VITALS
HEART RATE: 76 BPM | DIASTOLIC BLOOD PRESSURE: 64 MMHG | TEMPERATURE: 98.6 F | WEIGHT: 133 LBS | BODY MASS INDEX: 21.8 KG/M2 | SYSTOLIC BLOOD PRESSURE: 100 MMHG

## 2019-02-06 DIAGNOSIS — M62.838 NECK MUSCLE SPASM: ICD-10-CM

## 2019-02-06 DIAGNOSIS — S16.1XXA STRAIN OF NECK MUSCLE, INITIAL ENCOUNTER: Primary | ICD-10-CM

## 2019-02-06 PROCEDURE — 99213 OFFICE O/P EST LOW 20 MIN: CPT | Performed by: FAMILY MEDICINE

## 2019-02-06 RX ORDER — CYCLOBENZAPRINE HCL 5 MG
5 TABLET ORAL 2 TIMES DAILY PRN
Qty: 20 TABLET | Refills: 0 | Status: SHIPPED | OUTPATIENT
Start: 2019-02-06 | End: 2019-02-16

## 2019-02-06 NOTE — TELEPHONE ENCOUNTER
Joint Pain    Onset: left shoulder under breast x 2 days- thinks she slept wrong on the arm    No injury    Description:   Location: left shoulder- back   Character: Sharp only with arm movement-   When she takes a deep breath she has the same pain in arm    Intensity: moderate    Progression of Symptoms: same    Accompanying Signs & Symptoms:  Other symptoms: none    History:   Previous similar pain: no       Precipitating factors:   Trauma or overuse: no     Alleviating factors:  Improved by: nothing  Denies short of breat/chest pressure/arm numbness or tingling  Advised patient that if she is having chest pain or pressure- feelings of something pushing on her chest/arm numbness/tingling/sob to call an ambulance  Advised that it is difficult to tell over the phone if this is muscular or heart related - patient states this is not her heart its her shoulder  Advised again to call 911 if any of the praevius symptoms happen- patient verbalized understanding  Therapies Tried and outcome: none  Guideline used:  Telephone Triage Protocols for Nurses, Fifth Edition, Myrna Paredes RN BSN  [facility] [place]  [phone]

## 2019-02-06 NOTE — PROGRESS NOTES
SUBJECTIVE:   Reji Snyder is a 29 year old female who presents to clinic today for the following health issues:      Joint Pain    Onset: left shoulder x 2 days- thinks she slept wrong on the arm    No injury    Description:   Location: left shoulder- back   Character: Sharp only with arm movement-   When she takes a deep breath she has the same pain in arm    Intensity: moderate    Progression of Symptoms: same    Accompanying Signs & Symptoms:  Other symptoms: none    History:   Previous similar pain: no       Precipitating factors:   Trauma or overuse: no     Alleviating factors:  Improved by: nothing          Problem list and histories reviewed & adjusted, as indicated.  Additional history: as documented    Patient Active Problem List   Diagnosis     Dysmenorrhea     CARDIOVASCULAR SCREENING; LDL GOAL LESS THAN 160     Esophageal reflux     Seasonal allergic rhinitis     Vitamin D deficiency     Cystic acne     ASCUS favor benign     Presence of intrauterine contraceptive device     Past Surgical History:   Procedure Laterality Date     ------------OTHER-------------      Female Circumcision       Social History     Tobacco Use     Smoking status: Never Smoker     Smokeless tobacco: Never Used   Substance Use Topics     Alcohol use: No     Alcohol/week: 0.0 oz     Family History   Problem Relation Age of Onset     Breast Cancer No family hx of      Cancer - colorectal No family hx of      Diabetes No family hx of      C.A.D. No family hx of          Current Outpatient Medications   Medication Sig Dispense Refill     cyclobenzaprine (FLEXERIL) 5 MG tablet Take 1 tablet (5 mg) by mouth 2 times daily as needed for muscle spasms 20 tablet 0     Prenatal Vit-Fe Fumarate-FA (PRENATAL MULTIVITAMIN W/IRON) 27-0.8 MG tablet TAKE 1 TABLET BY MOUTH EVERY  tablet 0     albuterol (PROAIR HFA/PROVENTIL HFA/VENTOLIN HFA) 108 (90 Base) MCG/ACT inhaler Inhale 2 puffs into the lungs every 6 hours as needed for shortness  of breath / dyspnea or wheezing (Patient not taking: Reported on 2/6/2019) 1 Inhaler 0     levonorgestrel (KYLEENA) 19.5 MG IUD 1 each (19.5 mg) by Intrauterine route once for 1 dose 1 each 0     norethindrone (MICRONOR) 0.35 MG per tablet Take 1 tablet (0.35 mg) by mouth daily (Patient not taking: Reported on 11/13/2018) 84 tablet 4       Reviewed and updated as needed this visit by clinical staff  Tobacco  Allergies  Meds  Soc Hx      Reviewed and updated as needed this visit by Provider         ROS:  CONSTITUTIONAL: NEGATIVE for fever, chills, change in weight  ENT/MOUTH: NEGATIVE for ear, mouth and throat problems  RESP: NEGATIVE for significant cough or SOB  CV: NEGATIVE for chest pain, palpitations or peripheral edema  MUSCULOSKELETAL: POSITIVE  for arthralgias     OBJECTIVE:                                                    /64   Pulse 76   Temp 98.6  F (37  C) (Tympanic)   Wt 60.3 kg (133 lb)   LMP 01/16/2019 (Approximate)   Breastfeeding? Yes   BMI 21.80 kg/m    Body mass index is 21.8 kg/m .   GENERAL: healthy, alert, well nourished, well hydrated, no distress  HENT: ear canals- normal; TMs- normal; Nose- normal; Mouth- no ulcers, no lesions  NECK: no tenderness, no adenopathy, no asymmetry, no masses, no stiffness; thyroid- normal to palpation  RESP: lungs clear to auscultation - no rales, no rhonchi, no wheezes  CV: regular rates and rhythm, normal S1 S2, no S3 or S4 and no murmur, no click or rub -  Neck muscle spasm noted.  Range of motion is slight limited due to discomfort.     ASSESSMENT/PLAN:                                                        ICD-10-CM    1. Strain of neck muscle, initial encounter S16.1XXA cyclobenzaprine (FLEXERIL) 5 MG tablet   2. Neck muscle spasm M62.838 cyclobenzaprine (FLEXERIL) 5 MG tablet     Patient most likely has neck muscle spasm due to possible sleeping in the wrong way.  Suggested ibuprofen as needed.  Flexeril to be used as needed once or twice a  day.  Use some BenGay cream.  Follow-up if symptoms are not getting better      Nikos Lilly MD  [facility] – [place], [place]

## 2019-03-20 ENCOUNTER — OFFICE VISIT (OUTPATIENT)
Dept: FAMILY MEDICINE | Facility: CLINIC | Age: 29
End: 2019-03-20
Payer: COMMERCIAL

## 2019-03-20 VITALS
WEIGHT: 132 LBS | TEMPERATURE: 98.1 F | DIASTOLIC BLOOD PRESSURE: 64 MMHG | SYSTOLIC BLOOD PRESSURE: 110 MMHG | HEART RATE: 84 BPM | BODY MASS INDEX: 21.63 KG/M2

## 2019-03-20 DIAGNOSIS — R09.81 SINUS CONGESTION: Primary | ICD-10-CM

## 2019-03-20 DIAGNOSIS — J30.2 SEASONAL ALLERGIC RHINITIS, UNSPECIFIED TRIGGER: ICD-10-CM

## 2019-03-20 PROCEDURE — 99213 OFFICE O/P EST LOW 20 MIN: CPT | Performed by: FAMILY MEDICINE

## 2019-03-20 RX ORDER — FLUTICASONE PROPIONATE 50 MCG
1-2 SPRAY, SUSPENSION (ML) NASAL DAILY
Qty: 9.9 ML | Refills: 1 | Status: SHIPPED | OUTPATIENT
Start: 2019-03-20 | End: 2021-03-11

## 2019-03-20 NOTE — PROGRESS NOTES
SUBJECTIVE:   Reji Snyder is a 29 year old female who presents to clinic today for the following health issues:      Acute Illness   Acute illness concerns: left sided sinus pressure/, sinus congestion. sneezing  Onset: 2 weeks     Fever: no    Chills/Sweats: no    Headache (location?): YES-      Sinus Pressure:YES    Conjunctivitis:  no    Ear Pain: no    Rhinorrhea: no    Congestion: no    Sore Throat: no     Cough: no    Wheeze: no    Decreased Appetite: no    Nausea: no    Vomiting: no    Diarrhea:  no    Dysuria/Freq.: no    Fatigue/Achiness: no    Sick/Strep Exposure: no     Therapies Tried and outcome: NONE          Problem list and histories reviewed & adjusted, as indicated.  Additional history: as documented    Patient Active Problem List   Diagnosis     Dysmenorrhea     CARDIOVASCULAR SCREENING; LDL GOAL LESS THAN 160     Esophageal reflux     Seasonal allergic rhinitis     Vitamin D deficiency     Cystic acne     ASCUS favor benign     Presence of intrauterine contraceptive device     Past Surgical History:   Procedure Laterality Date     ------------OTHER-------------      Female Circumcision       Social History     Tobacco Use     Smoking status: Never Smoker     Smokeless tobacco: Never Used   Substance Use Topics     Alcohol use: No     Alcohol/week: 0.0 oz     Family History   Problem Relation Age of Onset     Breast Cancer No family hx of      Cancer - colorectal No family hx of      Diabetes No family hx of      C.A.D. No family hx of          Current Outpatient Medications   Medication Sig Dispense Refill     fluticasone (FLONASE) 50 MCG/ACT nasal spray Spray 1-2 sprays into both nostrils daily 9.9 mL 1     Prenatal Vit-Fe Fumarate-FA (PRENATAL MULTIVITAMIN W/IRON) 27-0.8 MG tablet TAKE 1 TABLET BY MOUTH EVERY  tablet 0     albuterol (PROAIR HFA/PROVENTIL HFA/VENTOLIN HFA) 108 (90 Base) MCG/ACT inhaler Inhale 2 puffs into the lungs every 6 hours as needed for shortness of breath /  dyspnea or wheezing (Patient not taking: Reported on 2/6/2019) 1 Inhaler 0     levonorgestrel (KYLEENA) 19.5 MG IUD 1 each (19.5 mg) by Intrauterine route once for 1 dose 1 each 0     norethindrone (MICRONOR) 0.35 MG per tablet Take 1 tablet (0.35 mg) by mouth daily (Patient not taking: Reported on 11/13/2018) 84 tablet 4       Reviewed and updated as needed this visit by clinical staff  Tobacco  Allergies  Meds       Reviewed and updated as needed this visit by Provider         ROS:  CONSTITUTIONAL: NEGATIVE for fever, chills, change in weight  ROS otherwise negative other than noted above.    OBJECTIVE:                                                    /64   Pulse 84   Temp 98.1  F (36.7  C) (Tympanic)   Wt 59.9 kg (132 lb)   LMP 03/10/2019 (Approximate)   Breastfeeding? Yes   BMI 21.63 kg/m    Body mass index is 21.63 kg/m .   GENERAL: healthy, alert, well nourished, well hydrated, no distress  HENT: ear canals- normal; TMs- normal; Nose- normal; Mouth- no ulcers, no lesions  Sinus tenderness noted.  NECK: no tenderness, no adenopathy, no asymmetry, no masses, no stiffness; thyroid- normal to palpation  RESP: lungs clear to auscultation - no rales, no rhonchi, no wheezes  CV: regular rates and rhythm, normal S1 S2, no S3 or S4 and no murmur, no click or rub -       ASSESSMENT/PLAN:                                                        ICD-10-CM    1. Sinus congestion R09.81 fluticasone (FLONASE) 50 MCG/ACT nasal spray   2. Seasonal allergic rhinitis, unspecified trigger J30.2      Patient does not have any fever or any upper respiratory symptoms.  Symptoms could be most likely underlying allergies versus viral.  I suggested Flonase to see if that helps.  Sinus rinse advised.    Nikos Lilly MD  [facility] – [place]

## 2019-04-23 DIAGNOSIS — Z32.01 PREGNANCY TEST POSITIVE: ICD-10-CM

## 2019-04-23 RX ORDER — PRENATAL VIT/IRON FUM/FOLIC AC 27MG-0.8MG
1 TABLET ORAL DAILY
Qty: 100 TABLET | Refills: 3 | Status: SHIPPED | OUTPATIENT
Start: 2019-04-23 | End: 2021-03-11

## 2019-04-23 NOTE — TELEPHONE ENCOUNTER
Requested Prescriptions   Pending Prescriptions Disp Refills     Prenatal Vit-Fe Fumarate-FA (PRENATAL MULTIVITAMIN W/IRON) 27-0.8 MG tablet 100 tablet 0     Sig: Take 1 tablet by mouth daily       There is no refill protocol information for this order        Last Written Prescription Date:  12/3/2018  Last Fill Quantity: , 100 # refills:  0  Last office visit: 3/20/2019 with prescribing provider:     Future Office Visit:

## 2019-04-23 NOTE — TELEPHONE ENCOUNTER
Prescription approved per [facility] [place] Refill Protocol.  Tressa Sotelo RN   [facility] - Triage

## 2019-09-04 DIAGNOSIS — Z32.01 PREGNANCY TEST POSITIVE: ICD-10-CM

## 2019-09-05 RX ORDER — PRENATAL VIT/IRON FUM/FOLIC AC 27MG-0.8MG
TABLET ORAL
Qty: 70 TABLET | Refills: 0 | OUTPATIENT
Start: 2019-09-05

## 2019-09-05 NOTE — TELEPHONE ENCOUNTER
Requested Prescriptions   Pending Prescriptions Disp Refills     Prenatal Vit-Fe Fumarate-FA (PRENATAL MULTIVITAMIN W/IRON) 27-0.8 MG tablet [Pharmacy Med Name: PRENATAL 27-0.8MG TABLETS] 70 tablet 0     Sig: TAKE ONE TABLET BY MOUTH DAILY       There is no refill protocol information for this order        Last Written Prescription Date:  04/23/2019  Last Fill Quantity: 100,  # refills: 3   Last office visit: 3/20/2019 with prescribing provider:  yes   Future Office Visit:

## 2020-02-17 ENCOUNTER — HEALTH MAINTENANCE LETTER (OUTPATIENT)
Age: 30
End: 2020-02-17

## 2020-05-03 DIAGNOSIS — Z32.01 PREGNANCY TEST POSITIVE: ICD-10-CM

## 2020-05-04 NOTE — TELEPHONE ENCOUNTER
I have not seen this patient in the last 1 year.  Is it because she is currently pregnant ?  Please inquire some more information.

## 2020-05-05 NOTE — TELEPHONE ENCOUNTER
2nd attempt: Line is busy, unable to leave voicemail.     Sent mychart message to patient since she is active on mychart.     Sandra Post RN, BSN  [facility] [place] [facility] [place]

## 2020-05-06 RX ORDER — PRENATAL VIT/IRON FUM/FOLIC AC 27MG-0.8MG
1 TABLET ORAL DAILY
Qty: 100 TABLET | Refills: 3 | OUTPATIENT
Start: 2020-05-06

## 2020-05-19 DIAGNOSIS — Z32.01 PREGNANCY TEST POSITIVE: ICD-10-CM

## 2020-05-20 RX ORDER — PRENATAL VIT/IRON FUM/FOLIC AC 27MG-0.8MG
1 TABLET ORAL DAILY
Qty: 100 TABLET | Refills: 3 | OUTPATIENT
Start: 2020-05-20

## 2020-09-22 ENCOUNTER — OFFICE VISIT (OUTPATIENT)
Dept: FAMILY MEDICINE | Facility: CLINIC | Age: 30
End: 2020-09-22
Payer: COMMERCIAL

## 2020-09-22 VITALS
SYSTOLIC BLOOD PRESSURE: 102 MMHG | HEART RATE: 89 BPM | BODY MASS INDEX: 23.6 KG/M2 | WEIGHT: 144 LBS | TEMPERATURE: 98.1 F | DIASTOLIC BLOOD PRESSURE: 66 MMHG | OXYGEN SATURATION: 98 %

## 2020-09-22 DIAGNOSIS — M54.2 CERVICALGIA: ICD-10-CM

## 2020-09-22 DIAGNOSIS — M54.41 ACUTE BILATERAL LOW BACK PAIN WITH RIGHT-SIDED SCIATICA: Primary | ICD-10-CM

## 2020-09-22 PROCEDURE — 99213 OFFICE O/P EST LOW 20 MIN: CPT | Performed by: PHYSICIAN ASSISTANT

## 2020-09-22 RX ORDER — IBUPROFEN 600 MG/1
600 TABLET, FILM COATED ORAL EVERY 6 HOURS PRN
Qty: 30 TABLET | Refills: 0 | Status: SHIPPED | OUTPATIENT
Start: 2020-09-22 | End: 2020-10-02

## 2020-09-22 NOTE — PROGRESS NOTES
Subjective     Reji Snyder is a 30 year old female who presents to clinic today for the following health issues:    HPI       Concern - right side pain  Onset: x 2 days  Description: entire right side hurts from shoulder all the way down into her leg  Intensity: mild  Progression of Symptoms:  worsening  Accompanying Signs & Symptoms:   Previous history of similar problem:   Precipitating factors:        Worsened by:   Alleviating factors:        Improved by:   Therapies tried and outcome:       Reji presents to the clinic with right sided neck pain and back pain.  The neck pain began when she awoke  2 days ago.  Her neck feels stiff, she is not having any radiation of pain down her arm, arm weakness or tingling/numbness.      She has also been experiencing bilateral lower back pain that intermittently radiates into her right leg, no injury.  No LE weakness, tingling or numbness. She has not tried anything for her symptoms.         Review of Systems   Constitutional, HEENT, cardiovascular, pulmonary, GI, , musculoskeletal, neuro, skin, endocrine and psych systems are negative, except as otherwise noted.      Objective    /66   Pulse 89   Temp 98.1  F (36.7  C) (Tympanic)   Wt 65.3 kg (144 lb)   SpO2 98%   BMI 23.60 kg/m    Body mass index is 23.6 kg/m .     Physical Exam   GENERAL: healthy, alert and no distress  RESP: lungs clear to auscultation - no rales, rhonchi or wheezes  CV: regular rate and rhythm, normal S1 S2, no S3 or S4, no murmur  MS:TTP along right paraspinal neck muscles, FROM of neck. No specific lower back TTP, FROM of LE bilaterally, 5/5 strength of LE, negative SLR  no gross musculoskeletal defects noted, no edema  PSYCH: mentation appears normal, affect normal/bright        Assessment & Plan     1. Acute bilateral low back pain with right-sided sciatica  Advise that she begin using ibuprofen for pain control.  If symptoms persist she may also start PT.  If no improvement in 3-4  weeks will consider imaging.   - ibuprofen (ADVIL/MOTRIN) 600 MG tablet; Take 1 tablet (600 mg) by mouth every 6 hours as needed for moderate pain  Dispense: 30 tablet; Refill: 0  - [facility]; Future    2. Cervicalgia  - ibuprofen (ADVIL/MOTRIN) 600 MG tablet; Take 1 tablet (600 mg) by mouth every 6 hours as needed for moderate pain  Dispense: 30 tablet; Refill: 0        Return in about 1 week (around 9/29/2020) for follow up if no improvement.    Jeronimo Garnica PA-C  [facility] [place]

## 2020-10-09 ENCOUNTER — OFFICE VISIT (OUTPATIENT)
Dept: FAMILY MEDICINE | Facility: CLINIC | Age: 30
End: 2020-10-09
Payer: COMMERCIAL

## 2020-10-09 DIAGNOSIS — E55.9 VITAMIN D DEFICIENCY: ICD-10-CM

## 2020-10-09 DIAGNOSIS — Z00.00 ENCOUNTER FOR ANNUAL PHYSICAL EXAM: Primary | ICD-10-CM

## 2020-10-09 DIAGNOSIS — Z12.4 SCREENING FOR CERVICAL CANCER: ICD-10-CM

## 2020-10-09 DIAGNOSIS — M54.50 RIGHT-SIDED LOW BACK PAIN WITHOUT SCIATICA, UNSPECIFIED CHRONICITY: ICD-10-CM

## 2020-10-09 PROCEDURE — 99395 PREV VISIT EST AGE 18-39: CPT | Performed by: PHYSICIAN ASSISTANT

## 2020-10-09 PROCEDURE — 82306 VITAMIN D 25 HYDROXY: CPT | Performed by: PHYSICIAN ASSISTANT

## 2020-10-09 PROCEDURE — 36415 COLL VENOUS BLD VENIPUNCTURE: CPT | Performed by: PHYSICIAN ASSISTANT

## 2020-10-09 PROCEDURE — 99213 OFFICE O/P EST LOW 20 MIN: CPT | Mod: 25 | Performed by: PHYSICIAN ASSISTANT

## 2020-10-09 PROCEDURE — 87624 HPV HI-RISK TYP POOLED RSLT: CPT | Performed by: PHYSICIAN ASSISTANT

## 2020-10-09 PROCEDURE — G0145 SCR C/V CYTO,THINLAYER,RESCR: HCPCS | Performed by: PHYSICIAN ASSISTANT

## 2020-10-09 ASSESSMENT — MIFFLIN-ST. JEOR: SCORE: 1372.45

## 2020-10-09 NOTE — PROGRESS NOTES
SUBJECTIVE:   CC: Reji Snyder is an 30 year old woman who presents for preventive health visit.     Patient has been advised of split billing requirements and indicates understanding: Yes  Healthy Habits:    Getting at least 3 servings of Calcium per day:  Yes    Bi-annual eye exam:  NO    Dental care twice a year:  Yes    Sleep apnea or symptoms of sleep apnea:  None    Diet:  Regular (no restrictions)    Frequency of exercise:  1 day/week    Duration of exercise:  30-45 minutes    Taking medications regularly:  Yes    Barriers to taking medications:  None    Medication side effects:  None    PHQ-2 Total Score:    Additional concerns today:  Yes          Right side pain      Duration: 1 month    Description (location/character/radiation): right side low back pain that does not radiate.  Worse with twisting.bending.  No LE weakness/tingling, numbness    Intensity:  moderate    Accompanying signs and symptoms: None    History (similar episodes/previous evaluation): None    Precipitating or alleviating factors: None    Therapies tried and outcome: Ibuprofen helps a little       Today's PHQ-2 Score:   PHQ-2 ( 1999 Pfizer) 9/22/2020   Q1: Little interest or pleasure in doing things 0   Q2: Feeling down, depressed or hopeless 0   PHQ-2 Score 0       Abuse: Current or Past (Physical, Sexual or Emotional) - No  Do you feel safe in your environment? Yes        Social History     Tobacco Use     Smoking status: Never Smoker     Smokeless tobacco: Never Used   Substance Use Topics     Alcohol use: No     Alcohol/week: 0.0 standard drinks       Alcohol Use 4/18/2016   Prescreen: >3 drinks/day or >7 drinks/week? The patient does not drink >3 drinks per day nor >7 drinks per week.       Reviewed orders with patient.  Reviewed health maintenance and updated orders accordingly - Yes  Patient Active Problem List   Diagnosis     Dysmenorrhea     CARDIOVASCULAR SCREENING; LDL GOAL LESS THAN 160     Esophageal reflux     Seasonal  allergic rhinitis     Vitamin D deficiency     Cystic acne     ASCUS favor benign     Presence of intrauterine contraceptive device     Past Surgical History:   Procedure Laterality Date     ------------OTHER-------------      Female Circumcision       Social History     Tobacco Use     Smoking status: Never Smoker     Smokeless tobacco: Never Used   Substance Use Topics     Alcohol use: No     Alcohol/week: 0.0 standard drinks     Family History   Problem Relation Age of Onset     Breast Cancer No family hx of      Cancer - colorectal No family hx of      Diabetes No family hx of      C.A.D. No family hx of          Current Outpatient Medications   Medication Sig Dispense Refill     Prenatal Vit-Fe Fumarate-FA (PRENATAL MULTIVITAMIN W/IRON) 27-0.8 MG tablet Take 1 tablet by mouth daily 100 tablet 3     albuterol (PROAIR HFA/PROVENTIL HFA/VENTOLIN HFA) 108 (90 Base) MCG/ACT inhaler Inhale 2 puffs into the lungs every 6 hours as needed for shortness of breath / dyspnea or wheezing (Patient not taking: Reported on 2/6/2019) 1 Inhaler 0     fluticasone (FLONASE) 50 MCG/ACT nasal spray Spray 1-2 sprays into both nostrils daily (Patient not taking: Reported on 9/22/2020) 9.9 mL 1     levonorgestrel (KYLEENA) 19.5 MG IUD 1 each (19.5 mg) by Intrauterine route once for 1 dose 1 each 0     norethindrone (MICRONOR) 0.35 MG per tablet Take 1 tablet (0.35 mg) by mouth daily (Patient not taking: Reported on 11/13/2018) 84 tablet 4     No Known Allergies  Recent Labs   Lab Test 04/18/16  1146 10/07/15  1102 05/07/15  0950 04/22/14  1345 08/19/13  0845 08/19/13  0845   *  --   --   --   --  161*   HDL 69  --   --   --   --  57   TRIG 45  --   --   --   --  54   ALT  --   --   --  8  --   --    CR 0.50*  --   --  0.63  --   --    GFRESTIMATED >90  Non  GFR Calc    --   --  >90  --   --    GFRESTBLACK >90   GFR Calc    --   --  >90  --   --    POTASSIUM 3.8  --   --  4.0  --   --    TSH  --   3.12 5.19*  --    < > 4.22    < > = values in this interval not displayed.        Mammogram not appropriate for this patient based on age.    Pertinent mammograms are reviewed under the imaging tab.  History of abnormal Pap smear: NO - age 30-65 PAP every 5 years with negative HPV co-testing recommended  PAP / HPV Latest Ref Rng & Units 2017   PAP - NIL ASC-US(A)   HPV 16 DNA NEG - Negative   HPV 18 DNA NEG - Negative   OTHER HR HPV NEG - Negative     Reviewed and updated as needed this visit by clinical staff  Tobacco  Allergies  Meds   Med Hx  Surg Hx  Fam Hx  Soc Hx        Reviewed and updated as needed this visit by Provider  Tobacco     Med Hx  Surg Hx  Fam Hx         Past Medical History:   Diagnosis Date     ASCUS favor benign 16    Neg HPV. 3 yr co-testing     Constipation      Cystic acne      Dysmenorrhea      Esophageal reflux      Female circumcision      Seasonal allergic rhinitis      Vitamin D deficiency       Past Surgical History:   Procedure Laterality Date     ------------OTHER-------------      Female Circumcision     OB History    Para Term  AB Living   1 1 1 0 0 1   SAB TAB Ectopic Multiple Live Births   0 0 0 0 1      # Outcome Date GA Lbr Ed/2nd Weight Sex Delivery Anes PTL Lv   1 Term 18 38w6d 05:25 / 00:52 2.8 kg (6 lb 2.8 oz) M Vag-Vacuum EPI N SUSU      Birth Comments: followed by [facility] and delivered by Trisha. spont labor with pit augmentation. female circumcision reversal/repair. vacuum for bradycardia, snd degree lac repaired along wtih the circumcision take down      Name: MARYBEL MANDUJANO HAMDI      Apgar1: 8  Apgar5: 9       Review of Systems  CONSTITUTIONAL: NEGATIVE for fever, chills, change in weight  INTEGUMENTARU/SKIN: NEGATIVE for worrisome rashes, moles or lesions  EYES: NEGATIVE for vision changes or irritation  ENT: NEGATIVE for ear, mouth and throat problems  RESP: NEGATIVE for significant cough or SOB  BREAST: NEGATIVE for  "masses, tenderness or discharge  CV: NEGATIVE for chest pain, palpitations or peripheral edema  GI: NEGATIVE for nausea, abdominal pain, heartburn, or change in bowel habits  : NEGATIVE for unusual urinary or vaginal symptoms. Periods are regular.  MUSCULOSKELETAL: NEGATIVE for significant arthralgias or myalgia  NEURO: NEGATIVE for weakness, dizziness or paresthesias  PSYCHIATRIC: NEGATIVE for changes in mood or affect     OBJECTIVE:   BP (P) 102/70   Pulse (P) 82   Temp (P) 97.1  F (36.2  C) (Tympanic)   Resp (P) 14   Ht (P) 1.664 m (5' 5.5\")   Wt (P) 64.9 kg (143 lb)   LMP 09/25/2020   BMI (P) 23.43 kg/m    Physical Exam  GENERAL: healthy, alert and no distress  EYES: Eyes grossly normal to inspection, PERRL and conjunctivae and sclerae normal  HENT: ear canals and TM's normal, nose and mouth without ulcers or lesions  NECK: no adenopathy, no asymmetry, masses, or scars and thyroid normal to palpation  RESP: lungs clear to auscultation - no rales, rhonchi or wheezes  BREAST: normal without masses, tenderness or nipple discharge and no palpable axillary masses or adenopathy  CV: regular rate and rhythm, normal S1 S2, no S3 or S4, no murmur, click or rub, no peripheral edema and peripheral pulses strong  ABDOMEN: soft, nontender, no hepatosplenomegaly, no masses and bowel sounds normal   (female): normal female external genitalia, normal urethral meatus, vaginal mucosa, normal cervix/adnexa/uterus without masses or discharge  MS: no gross musculoskeletal defects noted, no edema,  Mild generalized right sided TTP of right paraspinal back muscles.   SKIN: no suspicious lesions or rashes  NEURO: Normal strength and tone, mentation intact and speech normal  PSYCH: mentation appears normal, affect normal/bright    Diagnostic Test Results:  none     ASSESSMENT/PLAN:   1. Encounter for annual physical exam    2. Right-sided low back pain without sciatica, unspecified chronicity  Advised warm compressed, " "tylenol/iuprofen for pain, gentle stretches.  If no improvement, follow up in 3-4 weeks    3. Vitamin D deficiency  - Vitamin D Deficiency    4. Screening for cervical cancer  - HPV High Risk Types DNA Cervical  - Pap imaged thin layer screen with HPV - recommended age 30 - 65 years (select HPV order below)    Patient has been advised of split billing requirements and indicates understanding: Yes  COUNSELING:  Reviewed preventive health counseling, as reflected in patient instructions       Regular exercise       Healthy diet/nutrition    Estimated body mass index is 23.43 kg/m  (pended) as calculated from the following:    Height as of this encounter: (P) 1.664 m (5' 5.5\").    Weight as of this encounter: (P) 64.9 kg (143 lb).        She reports that she has never smoked. She has never used smokeless tobacco.      Counseling Resources:  ATP IV Guidelines  Pooled Cohorts Equation Calculator  Breast Cancer Risk Calculator  BRCA-Related Cancer Risk Assessment: FHS-7 Tool  FRAX Risk Assessment  ICSI Preventive Guidelines  Dietary Guidelines for Americans, 2010  USDA's MyPlate  ASA Prophylaxis  Lung CA Screening    [facility]ER[facility]  "

## 2020-10-11 LAB — DEPRECATED CALCIDIOL+CALCIFEROL SERPL-MC: 28 UG/L (ref 20–75)

## 2020-10-14 LAB
COPATH REPORT: NORMAL
PAP: NORMAL

## 2020-10-15 LAB
FINAL DIAGNOSIS: NORMAL
HPV HR 12 DNA CVX QL NAA+PROBE: NEGATIVE
HPV16 DNA SPEC QL NAA+PROBE: NEGATIVE
HPV18 DNA SPEC QL NAA+PROBE: NEGATIVE
SPECIMEN DESCRIPTION: NORMAL
SPECIMEN SOURCE CVX/VAG CYTO: NORMAL

## 2020-11-29 ENCOUNTER — HEALTH MAINTENANCE LETTER (OUTPATIENT)
Age: 30
End: 2020-11-29

## 2021-03-08 ENCOUNTER — OFFICE VISIT (OUTPATIENT)
Dept: FAMILY MEDICINE | Facility: CLINIC | Age: 31
End: 2021-03-08
Payer: COMMERCIAL

## 2021-03-08 VITALS
OXYGEN SATURATION: 100 % | HEART RATE: 90 BPM | BODY MASS INDEX: 22.78 KG/M2 | TEMPERATURE: 98.8 F | DIASTOLIC BLOOD PRESSURE: 72 MMHG | SYSTOLIC BLOOD PRESSURE: 110 MMHG | WEIGHT: 139 LBS

## 2021-03-08 DIAGNOSIS — B02.29 HERPES ZOSTER VIRUS INFECTION OF FACE AND EAR NERVES: ICD-10-CM

## 2021-03-08 DIAGNOSIS — J01.90 ACUTE SINUSITIS WITH SYMPTOMS > 10 DAYS: Primary | ICD-10-CM

## 2021-03-08 PROCEDURE — 99214 OFFICE O/P EST MOD 30 MIN: CPT | Performed by: FAMILY MEDICINE

## 2021-03-08 RX ORDER — AMOXICILLIN 875 MG
875 TABLET ORAL 2 TIMES DAILY
Qty: 20 TABLET | Refills: 0 | Status: SHIPPED | OUTPATIENT
Start: 2021-03-08 | End: 2021-04-09

## 2021-03-08 RX ORDER — VALACYCLOVIR HYDROCHLORIDE 1 G/1
1000 TABLET, FILM COATED ORAL 2 TIMES DAILY
Qty: 20 TABLET | Refills: 0 | Status: SHIPPED | OUTPATIENT
Start: 2021-03-08 | End: 2021-04-09

## 2021-03-08 NOTE — PROGRESS NOTES
Assessment & Plan     Acute sinusitis with symptoms > 10 days  Has purulent postnasal drainage with sinus tenderness on both maxillary sinus, will have her to start abx for sinus infection   - amoxicillin (AMOXIL) 875 MG tablet; Take 1 tablet (875 mg) by mouth 2 times daily    Herpes zoster virus infection of face and ear nerves  Has grouped vesicles on CN7 zygomatic branch area, has potential of zoster  Will have her to start antiviral med   - valACYclovir (VALTREX) 1000 mg tablet; Take 1 tablet (1,000 mg) by mouth 2 times daily for 10 days           FUTURE APPOINTMENTS:       - Follow-up visit in 2 weeks if not improving     No follow-ups on file.    Brett Mendoza MD  [facility] BLAIR Mendoza is a 31 year old who presents for the following health issues     HPI       Concern - left side face pain  Onset: 2 days  Description: top of head down to jaw  Intensity: moderate  Progression of Symptoms:  worsening  Accompanying Signs & Symptoms: sharp and burning in ear  Previous history of similar problem: no  Precipitating factors:        Worsened by: no  Alleviating factors:        Improved by: none  Therapies tried and outcome: tylenol        Review of Systems   Constitutional, HEENT, cardiovascular, pulmonary, gi and gu systems are negative, except as otherwise noted.      Objective    /72 (Cuff Size: Adult Regular)   Pulse 90   Temp 98.8  F (37.1  C) (Tympanic)   Wt 63 kg (139 lb)   SpO2 100%   BMI (P) 22.78 kg/m    Body mass index is 22.78 kg/m  (pended).  Physical Exam   GENERAL: healthy, alert and no distress  HENT: ear canals and TM's normal, nose and mouth without ulcers or lesions  NECK: no adenopathy, no asymmetry, masses, or scars and thyroid normal to palpation  RESP: lungs clear to auscultation - no rales, rhonchi or wheezes  CV: regular rate and rhythm, normal S1 S2, no S3 or S4, no murmur, click or rub, no peripheral edema and peripheral pulses  strong  ABDOMEN: soft, nontender, no hepatosplenomegaly, no masses and bowel sounds normal  MS: no gross musculoskeletal defects noted, no edema  SKIN: grouped vesicles on left CN7 zygomatic branch

## 2021-03-11 DIAGNOSIS — Z32.01 PREGNANCY TEST POSITIVE: ICD-10-CM

## 2021-03-11 RX ORDER — PRENATAL VIT/IRON FUM/FOLIC AC 27MG-0.8MG
1 TABLET ORAL DAILY
Qty: 100 TABLET | Refills: 3 | Status: SHIPPED | OUTPATIENT
Start: 2021-03-11 | End: 2022-03-17

## 2021-04-09 ENCOUNTER — OFFICE VISIT (OUTPATIENT)
Dept: FAMILY MEDICINE | Facility: CLINIC | Age: 31
End: 2021-04-09
Payer: COMMERCIAL

## 2021-04-09 VITALS
HEART RATE: 98 BPM | DIASTOLIC BLOOD PRESSURE: 70 MMHG | WEIGHT: 139 LBS | BODY MASS INDEX: 22.78 KG/M2 | TEMPERATURE: 97.9 F | SYSTOLIC BLOOD PRESSURE: 108 MMHG | OXYGEN SATURATION: 100 %

## 2021-04-09 DIAGNOSIS — J32.0 CHRONIC MAXILLARY SINUSITIS: ICD-10-CM

## 2021-04-09 DIAGNOSIS — G44.209 TENSION HEADACHE: Primary | ICD-10-CM

## 2021-04-09 PROCEDURE — 99214 OFFICE O/P EST MOD 30 MIN: CPT | Performed by: INTERNAL MEDICINE

## 2021-04-09 RX ORDER — ACETAMINOPHEN 500 MG
500-1000 TABLET ORAL EVERY 6 HOURS PRN
Qty: 30 TABLET | Refills: 0 | Status: SHIPPED | OUTPATIENT
Start: 2021-04-09 | End: 2022-05-16

## 2021-04-09 RX ORDER — FLUTICASONE PROPIONATE 50 MCG
1 SPRAY, SUSPENSION (ML) NASAL 2 TIMES DAILY
Qty: 18.2 ML | Refills: 3 | Status: SHIPPED | OUTPATIENT
Start: 2021-04-09 | End: 2022-05-16

## 2021-04-09 NOTE — PATIENT INSTRUCTIONS
Your symptoms are appearing Chronic sinusitis. No need of another antibiotic. Given you nasal spray and Tylenol only for tension headaches as your are pregnant.     =========================      Patient Education     Chronic Sinusitis   Chronic sinusitis is a long-term swelling or infection of the sinuses. If sinusitis lasts more than 12 weeks (90 days), it is called chronic.    What is chronic sinusitis?  Sinuses are air-filled spaces in the skull behind the face. They are kept moist and clean by a lining of mucosa. Things such as pollen, smoke, and chemical fumes can bother the mucosa. Constant exposure to irritants can cause ongoing swelling of this lining. It can also harm tiny hairlike cilia that cover the mucosa. Cilia help carry mucus toward the opening of the sinus. Damage to cilia keeps mucus from draining from the sinuses.  Causes of chronic sinusitis  Problems that irritate the mucosa or block drainage can lead to chronic sinusitis. These may include:    Infections    Chronic allergies    Nasal polyps, deviated septum, or other blockages    Ongoing exposure to irritants, such as cigarette smoke or fumes    Asthma    Acute sinusitis that keeps coming back  Common symptoms of chronic sinusitis  Symptoms may include:    Facial pain and pressure    Headache and sinus pain    Nasal congestion    Thick, colored drainage from the nose    Thick mucus draining down the back of the throat (postnasal drainage)    Loss of smell    Fever    Cough    Sore throat  Diagnosing chronic sinusitis  Your healthcare provider will ask about your symptoms and past health. He or she will look at your nose and face. You may have imaging studies such as an X-ray or CT scan of the sinuses. Your healthcare provider may also take a sample of the drainage to check for bacteria. You may also have an endoscopy. During this test, the healthcare provider puts a lighted tube up your nose to look at your sinuses.  Treating chronic  sinusitis  The goal of treatment is to reduce irritation and swelling. Your plan may involve:    Taking medicines. Your healthcare provider may give you medicines to reduce the amount of mucus and swelling. These help unblock the sinuses and let them drain. You will need to take antibiotics if you have a bacterial infection.    Flushing your sinuses. Your healthcare provider may suggest sinus irrigation. This is flushing your sinuses with saltwater or saline solution. This helps to clear out mucus.    Controlling allergies. If you have allergies, you should have a plan to help control them. You may need to take medicines or get allergy shots.    Controlling nasal irritants. If you smoke, ask your healthcare provider for help to stop.    Having surgery. In some cases, you may need surgery on the nose, sinuses, or both. This can improve sinus drainage or remove nasal blockages.  [employer] last reviewed this educational content on 11/1/2019 2000-2021 [employer]. All rights reserved. This information is not intended as a substitute for professional medical care. Always follow your healthcare professional's instructions.

## 2021-04-09 NOTE — PROGRESS NOTES
Assessment and Plan  1. Tension headache  New problem with recurrent headaches. Since patient is pregnant , not much choices hence only tylenol.   - acetaminophen (TYLENOL) 500 MG tablet; Take 1-2 tablets (500-1,000 mg) by mouth every 6 hours as needed for mild pain  Dispense: 30 tablet; Refill: 0    2. Chronic maxillary sinusitis  S/P completion of antibiotic and patient still has symptoms of subacute / chronic sinusitis. No need of antibiotic at this time.   - fluticasone (FLONASE) 50 MCG/ACT nasal spray; Spray 1 spray into both nostrils 2 times daily  Dispense: 18.2 mL; Refill: 3       Patient Instructions   Your symptoms are appearing Chronic sinusitis. No need of another antibiotic. Given you nasal spray and Tylenol only for tension headaches as your are pregnant.     =========================      Patient Education     Chronic Sinusitis   Chronic sinusitis is a long-term swelling or infection of the sinuses. If sinusitis lasts more than 12 weeks (90 days), it is called chronic.    What is chronic sinusitis?  Sinuses are air-filled spaces in the skull behind the face. They are kept moist and clean by a lining of mucosa. Things such as pollen, smoke, and chemical fumes can bother the mucosa. Constant exposure to irritants can cause ongoing swelling of this lining. It can also harm tiny hairlike cilia that cover the mucosa. Cilia help carry mucus toward the opening of the sinus. Damage to cilia keeps mucus from draining from the sinuses.  Causes of chronic sinusitis  Problems that irritate the mucosa or block drainage can lead to chronic sinusitis. These may include:    Infections    Chronic allergies    Nasal polyps, deviated septum, or other blockages    Ongoing exposure to irritants, such as cigarette smoke or fumes    Asthma    Acute sinusitis that keeps coming back  Common symptoms of chronic sinusitis  Symptoms may include:    Facial pain and pressure    Headache and sinus pain    Nasal congestion    Thick,  colored drainage from the nose    Thick mucus draining down the back of the throat (postnasal drainage)    Loss of smell    Fever    Cough    Sore throat  Diagnosing chronic sinusitis  Your healthcare provider will ask about your symptoms and past health. He or she will look at your nose and face. You may have imaging studies such as an X-ray or CT scan of the sinuses. Your healthcare provider may also take a sample of the drainage to check for bacteria. You may also have an endoscopy. During this test, the healthcare provider puts a lighted tube up your nose to look at your sinuses.  Treating chronic sinusitis  The goal of treatment is to reduce irritation and swelling. Your plan may involve:    Taking medicines. Your healthcare provider may give you medicines to reduce the amount of mucus and swelling. These help unblock the sinuses and let them drain. You will need to take antibiotics if you have a bacterial infection.    Flushing your sinuses. Your healthcare provider may suggest sinus irrigation. This is flushing your sinuses with saltwater or saline solution. This helps to clear out mucus.    Controlling allergies. If you have allergies, you should have a plan to help control them. You may need to take medicines or get allergy shots.    Controlling nasal irritants. If you smoke, ask your healthcare provider for help to stop.    Having surgery. In some cases, you may need surgery on the nose, sinuses, or both. This can improve sinus drainage or remove nasal blockages.  [employer] last reviewed this educational content on 11/1/2019 2000-2021 [employer]. All rights reserved. This information is not intended as a substitute for professional medical care. Always follow your healthcare professional's instructions.             Return in about 2 weeks (around 4/23/2021), or if symptoms worsen or fail to improve, for Follow up of last visit.    Eleni Calvert MD  [facility]  GEGE Mendoza is a 31 year old who presents for the following health issues       HPI     Acute Illness  Acute illness concerns: sinus  pain   Onset/Duration: Wednesday   Symptoms:  Fever: no  Chills/Sweats: no  Headache (location?): YES  Sinus Pressure: YES  Conjunctivitis:  no  Ear Pain: no  Rhinorrhea: no  Congestion: no  Sore Throat: no  Cough: no  Wheeze: no  Decreased Appetite: YES  Nausea: no  Vomiting: no  Diarrhea: no  Dysuria/Freq.: no  Dysuria or Hematuria: no  Fatigue/Achiness: no  Sick/Strep Exposure: no  Therapies tried and outcome: None       No Known Allergies     Past Medical History:   Diagnosis Date     ASCUS favor benign 04/18/16    Neg HPV. 3 yr co-testing     Constipation      Cystic acne      Dysmenorrhea      Esophageal reflux      Female circumcision      Seasonal allergic rhinitis      Vitamin D deficiency        Past Surgical History:   Procedure Laterality Date     ------------OTHER-------------      Female Circumcision       Family History   Problem Relation Age of Onset     Breast Cancer No family hx of      Cancer - colorectal No family hx of      Diabetes No family hx of      C.A.D. No family hx of        Social History     Tobacco Use     Smoking status: Never Smoker     Smokeless tobacco: Never Used   Substance Use Topics     Alcohol use: No     Alcohol/week: 0.0 standard drinks        Current Outpatient Medications   Medication     acetaminophen (TYLENOL) 500 MG tablet     fluticasone (FLONASE) 50 MCG/ACT nasal spray     Prenatal Vit-Fe Fumarate-FA (PRENATAL MULTIVITAMIN W/IRON) 27-0.8 MG tablet     No current facility-administered medications for this visit.         Review of Systems   Constitutional, HEENT, cardiovascular, pulmonary, GI, , musculoskeletal, neuro, skin, endocrine and psych systems are negative, except as otherwise noted.      Objective    /70   Pulse 98   Temp 97.9  F (36.6  C) (Tympanic)   Wt 63 kg (139 lb)   LMP 02/02/2021   SpO2  100%   BMI (P) 22.78 kg/m    Body mass index is 22.78 kg/m  (pended).  Physical Exam   GENERAL: healthy, alert and no distress  ENT Exam : Ear canals and TM's normal, nose and mouth without ulcers or lesions, NO pharyngeal erythema, Cervical lymphadenopathy . POSITIVE for mild tenderness on palpation of the left maxillary Sinus.  NECK: no adenopathy, no asymmetry, masses, or scars and thyroid normal to palpation  RESP: lungs clear to auscultation - no rales, rhonchi or wheezes  CV: regular rate and rhythm, normal S1 S2, no S3 or S4, no murmur, click or rub, no peripheral edema and peripheral pulses strong  ABDOMEN: soft, nontender, no hepatosplenomegaly, no masses and bowel sounds normal  MS: no gross musculoskeletal defects noted, no edema    Labs and imaging reviewed and discussed with the patient.

## 2021-06-02 ENCOUNTER — ANCILLARY PROCEDURE (OUTPATIENT)
Dept: GENERAL RADIOLOGY | Facility: CLINIC | Age: 31
End: 2021-06-02
Attending: FAMILY MEDICINE
Payer: COMMERCIAL

## 2021-06-02 ENCOUNTER — NURSE TRIAGE (OUTPATIENT)
Dept: FAMILY MEDICINE | Facility: CLINIC | Age: 31
End: 2021-06-02

## 2021-06-02 ENCOUNTER — OFFICE VISIT (OUTPATIENT)
Dept: FAMILY MEDICINE | Facility: CLINIC | Age: 31
End: 2021-06-02
Payer: COMMERCIAL

## 2021-06-02 VITALS
TEMPERATURE: 98.9 F | WEIGHT: 143 LBS | OXYGEN SATURATION: 98 % | SYSTOLIC BLOOD PRESSURE: 110 MMHG | RESPIRATION RATE: 16 BRPM | DIASTOLIC BLOOD PRESSURE: 68 MMHG | HEIGHT: 66 IN | BODY MASS INDEX: 22.98 KG/M2 | HEART RATE: 97 BPM

## 2021-06-02 DIAGNOSIS — K59.00 CONSTIPATION, UNSPECIFIED CONSTIPATION TYPE: ICD-10-CM

## 2021-06-02 DIAGNOSIS — R07.81 RIB PAIN ON RIGHT SIDE: Primary | ICD-10-CM

## 2021-06-02 DIAGNOSIS — R07.81 RIB PAIN ON RIGHT SIDE: ICD-10-CM

## 2021-06-02 LAB — HCG UR QL: NEGATIVE

## 2021-06-02 PROCEDURE — 74019 RADEX ABDOMEN 2 VIEWS: CPT | Performed by: RADIOLOGY

## 2021-06-02 PROCEDURE — 99214 OFFICE O/P EST MOD 30 MIN: CPT | Performed by: FAMILY MEDICINE

## 2021-06-02 PROCEDURE — 81025 URINE PREGNANCY TEST: CPT | Performed by: FAMILY MEDICINE

## 2021-06-02 PROCEDURE — 71101 X-RAY EXAM UNILAT RIBS/CHEST: CPT | Mod: RT | Performed by: RADIOLOGY

## 2021-06-02 ASSESSMENT — MIFFLIN-ST. JEOR: SCORE: 1367.45

## 2021-06-02 ASSESSMENT — PAIN SCALES - GENERAL: PAINLEVEL: MILD PAIN (2)

## 2021-06-02 NOTE — TELEPHONE ENCOUNTER
CARE ADVICE:  BE SEEN IN CLINIC TODAY    Patient requested 5:40 shabana liable appointment today.  Declined 2:40 appointment        RN received call from Patient.    Patient has been having on/off right sided/abdominal  pain below ribs.    Pain now constant with  intermittently sharp pain.  3/3/10  sharp pain is 9/10.    Denies fever, vomiting.    Had BM this morning.    Last period May 26th    RN advised patient to go to ER or Urgent care if pain became worse prior to appointment.    Patient verbalized understanding.    Diego Hartman, RN, BSN, PHN  [facility]                Reason for Disposition    MODERATE OR MILD pain that comes and goes (cramps) lasts > 24 hours    Additional Information    Negative: Passed out (i.e., fainted, collapsed and was not responding)    Negative: Shock suspected (e.g., cold/pale/clammy skin, too weak to stand, low BP, rapid pulse)    Negative: Sounds like a life-threatening emergency to the triager    Negative: Chest pain    Negative: Pain is mainly in upper abdomen (if needed ask: 'is it mainly above the belly button?')    Negative: Abdominal pain and pregnant > 20 weeks    Negative: Abdominal pain and pregnant < 20 weeks    Negative: SEVERE abdominal pain (e.g., excruciating)    Negative: Vomiting red blood or black (coffee ground) material    Negative: Bloody, black, or tarry bowel movements (Exception: chronic-unchanged black-grey bowel movements and is taking iron pills or Pepto-bismol)    Negative: Vomiting and abdomen looks much more swollen than usual    Negative: White of the eyes have turned yellow (i.e., jaundice)    Negative: Blood in urine (red, pink, or tea-colored)    Negative: Fever > 103 F (39.4 C)    Negative: Fever > 101 F (38.3 C) and over 60 years of age    Negative: Fever > 100.0 F (37.8 C) and has diabetes mellitus or a weak immune system (e.g., HIV positive, cancer chemotherapy, organ transplant, splenectomy, chronic steroids)    Negative: Fever >  "100.0 F (37.8 C) and bedridden (e.g., nursing home patient, stroke, chronic illness, recovering from surgery)    Negative: Pregnant or could be pregnant (i.e., missed last menstrual period)    Answer Assessment - Initial Assessment Questions  1. LOCATION: \"Where does it hurt?\"       Right below ribs  2. RADIATION: \"Does the pain shoot anywhere else?\" (e.g., chest, back)      No  3. ONSET: \"When did the pain begin?\" (e.g., minutes, hours or days ago)         Started the beginning of this week  4. SUDDEN: \"Gradual or sudden onset?\"      Sudden onset  5. PATTERN \"Does the pain come and go, or is it constant?\"     - If constant: \"Is it getting better, staying the same, or worsening?\"       (Note: Constant means the pain never goes away completely; most serious pain is constant and it progresses)      - If intermittent: \"How long does it last?\" \"Do you have pain now?\"      (Note: Intermittent means the pain goes away completely between bouts)      Come and goes.  When it comes it is a sharp pain.  Last night and then 30 min ago.  Continuous pain and intermittently sharp.  6. SEVERITY: \"How bad is the pain?\"  (e.g., Scale 1-10; mild, moderate, or severe)    - MILD (1-3): doesn't interfere with normal activities, abdomen soft and not tender to touch     - MODERATE (4-7): interferes with normal activities or awakens from sleep, tender to touch     - SEVERE (8-10): excruciating pain, doubled over, unable to do any normal activities       9/10 sharp   Sitting there 3-4/10.     7. RECURRENT SYMPTOM: \"Have you ever had this type of abdominal pain before?\" If so, ask: \"When was the last time?\" and \"What happened that time?\"  No never      Push on it makes it worse.  Sitting/walking does not make it worse  8. CAUSE: \"What do you think is causing the abdominal pain?\"      Sister had appendecitis  9. RELIEVING/AGGRAVATING FACTORS: \"What makes it better or worse?\" (e.g., movement, antacids, bowel movement)    Pushing on it makes it " "worse  10. OTHER SYMPTOMS: \"Has there been any vomiting, diarrhea, constipation, or urine problems?\"          No fever, having regular BM  11. PREGNANCY: \"Is there any chance you are pregnant?\" \"When was your last menstrual period?\"        Miscarried  2 months ago.  May 26th last period    Protocols used: ABDOMINAL PAIN - FEMALE-A-OH      "

## 2021-06-02 NOTE — PROGRESS NOTES
Assessment & Plan     Rib pain on right side  Patient clinical exam is normal x-ray also reviewed official report pending most likely , muscular etiology once x-ray reviewed will make any recommendation.  Meantime she is advised to take some Tylenol, icing in that area.      Constipation, unspecified constipation type  He has history of constipation in the past as well advised to use increase fiber diet.  - HCG Qual, Urine ([id number])  - XR Abdomen 2 Views; Future           Return in about 2 weeks (around 6/16/2021) for if symptom does not get better.    Nikos Lilly MD  [facility] BLAIR Mendoza is a 31 year old who presents for the following health issues     HPI     Concern - Rib pain/ constipation, RUQ pain  Onset: a couple days  Description: pt is having pain in right side rib pain.  No injury.  Pt states that it comes and goes and is sharp when it is there.  No other symptoms  In the past also patient has some issues with constipation that cause some discomfort in the abdomen.  She is also pointing some discomfort in the right upper quadrant.  If there is no associated nausea vomiting her last menstrual period was 10 days ago.  Denies any diarrhea but feels her stools are slight harder.  No breathing difficulty.  Intensity: moderate  Progression of Symptoms:  same  Accompanying Signs & Symptoms: none  Previous history of similar problem: none  Precipitating factors:        Worsened by: none  Alleviating factors:        Improved by: none  Therapies tried and outcome:  none         Review of Systems   Constitutional, HEENT, cardiovascular, pulmonary, gi and gu systems are negative, except as otherwise noted.      Objective    LMP 02/02/2021   There is no height or weight on file to calculate BMI.  Physical Exam   GENERAL: healthy, alert and no distress  NECK: no adenopathy, no asymmetry, masses, or scars and thyroid normal to palpation  RESP: lungs clear to auscultation - no  rales, rhonchi or wheezes  CV: regular rate and rhythm, normal S1 S2, no S3 or S4, no murmur, click or rub, no peripheral edema and peripheral pulses strong  ABDOMEN: No discomfort with deep palpation the right upper quadrant.  Some discomfort in the rib area noted there is no bruising.  Abdomen is soft otherwise bowel sounds are present  MS: no gross musculoskeletal defects noted, no edema

## 2021-09-25 ENCOUNTER — HEALTH MAINTENANCE LETTER (OUTPATIENT)
Age: 31
End: 2021-09-25

## 2021-11-20 ENCOUNTER — HEALTH MAINTENANCE LETTER (OUTPATIENT)
Age: 31
End: 2021-11-20

## 2022-03-17 DIAGNOSIS — Z32.01 PREGNANCY TEST POSITIVE: ICD-10-CM

## 2022-03-17 RX ORDER — PRENATAL VIT/IRON FUM/FOLIC AC 27MG-0.8MG
1 TABLET ORAL DAILY
Qty: 100 TABLET | Refills: 3 | Status: SHIPPED | OUTPATIENT
Start: 2022-03-17 | End: 2023-06-02

## 2022-03-17 NOTE — TELEPHONE ENCOUNTER
Routing refill request to provider for review/approval because:  Drug not on the [facility] refill protocol     Madonna ALBERT RN  EP Triage

## 2022-04-06 ENCOUNTER — TRANSFERRED RECORDS (OUTPATIENT)
Dept: MULTI SPECIALTY CLINIC | Facility: CLINIC | Age: 32
End: 2022-04-06

## 2022-04-06 LAB
HPV ABSTRACT: NORMAL
PAP-ABSTRACT: NORMAL

## 2022-05-12 NOTE — PROGRESS NOTES
Assessment & Plan   Problem List Items Addressed This Visit        Digestive    Vitamin D deficiency    Relevant Medications    Vitamin D3 (CHOLECALCIFEROL) 25 mcg (1000 units) tablet      Other Visit Diagnoses     Fatigue, unspecified type    -  Primary    Acute non-recurrent maxillary sinusitis        Relevant Medications    fluticasone (FLONASE) 50 MCG/ACT nasal spray    Hypertonic Nasal Wash (SINUS RINSE) bottle    guaiFENesin (MUCINEX) 600 MG 12 hr tablet         -Vitamin D deficiency likely contributing to symptoms of fatigue, will supplement with 1,000 international unit(s) daily   -Sinus sx for almost 2 weeks, plan to trial fluticasone, sinus rinse and mucinex to start   - Emphasized hydration when breastfeeding    Patient Instructions   Your lab results indicate you have a vitamin D deficiency.    To treat this, I recommend supplementing with 1,000 IU vitamin D daily.  This can be purchased over the counter at any pharmacy.   Other sources of vitamin D include sunlight (however it is important to avoid sunburn), fish, orange juice, egg yolks, and Vitamin D fortified foods such as milk or orange juice.     We can then recheck your vitamin D level in 3 months.            Treatments for sinus infection:  1. Flonase - use one spray in each nostril once a day  2. Sinus Rinse or Neti Pot - these can be purchased at any pharmacy.  Use 1-2 times a day to relieve sinus congestion.     Additional treatment options for symptom relieve:  3. Take ibuprofen and acetaminophen (Tylenol) as needed for pain and/or fever.   4. Mucinex (guaifenisen) may help to thin the nasal mucus  5. Humidifiers or diffusers.  There is some evidence to support using essential oils such as eucalyptus, peppermint, citrus blends, or oregano in a diffuser for nasal congestion.  I do not recommend drinking the oils or placing them directly on the skin, since the concentrations of the oils are not regulated and vary between brands.  "            Return in about 2 weeks (around 5/30/2022) for a recheck if you are not improved.    [facility] BLAIR Mendoza is a 32 year old who presents for the following health issues     Patient had recent ER visit for fatigue and dizziness. Found to be deficient in vitamin D. Had a baby 3 months ago and still breastfeeding. States she has been feeling very tired past few months. Had 1 episode where she felt short of breath and hot when in a crowded room, sx resolved when she went outside, no chest pain or wheezing. Able to stay hydrated, appetite has been poor due to sinus symptoms.     Also notes 2 weeks of sinus congestion, headaches and ear pressure. States she gets sinus infections around this time of year and has been treated with antibiotics in the past.     History of Present Illness       Reason for visit:  D  Symptom onset:  Today  Symptoms include:  Very tired  Symptom intensity:  Moderate  Symptom progression:  Worsening  Had these symptoms before:  No  What makes it worse:  Tried  What makes it better:  No    She eats 0-1 servings of fruits and vegetables daily.She exercises with enough effort to increase her heart rate 20 to 29 minutes per day.         Review of Systems   Constitutional, HEENT, cardiovascular, pulmonary, gi and gu systems are negative, except as otherwise noted.      Objective    /88   Pulse 98   Temp 98.5  F (36.9  C) (Tympanic)   Resp 19   Ht 1.664 m (5' 5.51\")   Wt 63 kg (139 lb)   SpO2 99%   BMI 22.77 kg/m    Body mass index is 22.77 kg/m .  Physical Exam   GENERAL: healthy, alert and no distress  EYES: Eyes grossly normal to inspection, PERRL and conjunctivae and sclerae normal  HENT: ear canals and TM's normal, nose and mouth without ulcers or lesions  NECK: no adenopathy, no asymmetry, masses, or scars and thyroid normal to palpation  RESP: lungs clear to auscultation - no rales, rhonchi or " wheezes  CV: regular rate and rhythm, normal S1 S2, no S3 or S4, no murmur, click or rub, no peripheral edema and peripheral pulses strong        ----- Services Performed by a PA STUDENT in Presence of ATTENDING Physician Assistant-------    Sweta AHUMADA

## 2022-05-16 ENCOUNTER — OFFICE VISIT (OUTPATIENT)
Dept: FAMILY MEDICINE | Facility: CLINIC | Age: 32
End: 2022-05-16
Payer: COMMERCIAL

## 2022-05-16 VITALS
WEIGHT: 139 LBS | DIASTOLIC BLOOD PRESSURE: 88 MMHG | SYSTOLIC BLOOD PRESSURE: 126 MMHG | TEMPERATURE: 98.5 F | HEART RATE: 98 BPM | BODY MASS INDEX: 22.34 KG/M2 | HEIGHT: 66 IN | RESPIRATION RATE: 19 BRPM | OXYGEN SATURATION: 99 %

## 2022-05-16 DIAGNOSIS — R53.83 FATIGUE, UNSPECIFIED TYPE: Primary | ICD-10-CM

## 2022-05-16 DIAGNOSIS — J01.00 ACUTE NON-RECURRENT MAXILLARY SINUSITIS: ICD-10-CM

## 2022-05-16 DIAGNOSIS — E55.9 VITAMIN D DEFICIENCY: ICD-10-CM

## 2022-05-16 PROCEDURE — 99214 OFFICE O/P EST MOD 30 MIN: CPT | Performed by: PHYSICIAN ASSISTANT

## 2022-05-16 RX ORDER — SOD CHLOR,BICARB/SQUEEZ BOTTLE
1 PACKET, WITH RINSE DEVICE NASAL 2 TIMES DAILY
Qty: 5 EACH | Refills: 3 | Status: SHIPPED | OUTPATIENT
Start: 2022-05-16 | End: 2022-06-09

## 2022-05-16 RX ORDER — FLUTICASONE PROPIONATE 50 MCG
1 SPRAY, SUSPENSION (ML) NASAL DAILY
Qty: 9.9 ML | Refills: 3 | Status: SHIPPED | OUTPATIENT
Start: 2022-05-16 | End: 2023-05-15

## 2022-05-16 RX ORDER — GUAIFENESIN 600 MG/1
1200 TABLET, EXTENDED RELEASE ORAL 2 TIMES DAILY
Qty: 30 TABLET | Refills: 1 | Status: SHIPPED | OUTPATIENT
Start: 2022-05-16 | End: 2022-06-09

## 2022-05-16 RX ORDER — VITAMIN B COMPLEX
1 TABLET ORAL DAILY
Qty: 90 TABLET | Refills: 3 | Status: SHIPPED | OUTPATIENT
Start: 2022-05-16 | End: 2023-06-01

## 2022-05-16 ASSESSMENT — PAIN SCALES - GENERAL: PAINLEVEL: EXTREME PAIN (9)

## 2022-05-16 NOTE — PATIENT INSTRUCTIONS
Your lab results indicate you have a vitamin D deficiency.    To treat this, I recommend supplementing with 1,000 IU vitamin D daily.  This can be purchased over the counter at any pharmacy.   Other sources of vitamin D include sunlight (however it is important to avoid sunburn), fish, orange juice, egg yolks, and Vitamin D fortified foods such as milk or orange juice.     We can then recheck your vitamin D level in 3 months.            Treatments for sinus infection:  1. Flonase - use one spray in each nostril once a day  2. Sinus Rinse or Neti Pot - these can be purchased at any pharmacy.  Use 1-2 times a day to relieve sinus congestion.     Additional treatment options for symptom relieve:  3. Take ibuprofen and acetaminophen (Tylenol) as needed for pain and/or fever.   4. Mucinex (guaifenisen) may help to thin the nasal mucus  5. Humidifiers or diffusers.  There is some evidence to support using essential oils such as eucalyptus, peppermint, citrus blends, or oregano in a diffuser for nasal congestion.  I do not recommend drinking the oils or placing them directly on the skin, since the concentrations of the oils are not regulated and vary between brands.

## 2022-06-09 ENCOUNTER — OFFICE VISIT (OUTPATIENT)
Dept: FAMILY MEDICINE | Facility: CLINIC | Age: 32
End: 2022-06-09
Payer: COMMERCIAL

## 2022-06-09 VITALS
HEIGHT: 66 IN | HEART RATE: 85 BPM | DIASTOLIC BLOOD PRESSURE: 86 MMHG | OXYGEN SATURATION: 95 % | TEMPERATURE: 97.8 F | BODY MASS INDEX: 22.5 KG/M2 | WEIGHT: 140 LBS | RESPIRATION RATE: 18 BRPM | SYSTOLIC BLOOD PRESSURE: 135 MMHG

## 2022-06-09 DIAGNOSIS — H66.92 LEFT ACUTE OTITIS MEDIA: Primary | ICD-10-CM

## 2022-06-09 PROCEDURE — 99213 OFFICE O/P EST LOW 20 MIN: CPT | Performed by: PHYSICIAN ASSISTANT

## 2022-06-09 ASSESSMENT — PAIN SCALES - GENERAL: PAINLEVEL: EXTREME PAIN (9)

## 2022-06-09 NOTE — PROGRESS NOTES
"Assessment and Plan:     (H66.92) Left acute otitis media  (primary encounter diagnosis)  Comment: history of ear infections, left ear pain x 2 weeks, left TM with fluid/mild bulging, she is breastfeeding discussed small amount of augmentin will enter breast milk, she'll monitor baby closely while on it  Plan: amoxicillin-clavulanate (AUGMENTIN) 875-125 MG         tablet  Call if not better or worsening  Discussed when to be seen promptly       Enriqueta Alexis PA-C        Esme Mendoza is a 32 year old who presents for the following health issues     HPI     Left-sided ear pain x 2 weeks  She denies drainage, fever/chills  She hasn't had any congestion/cough  She is not a swimmer  She has history of ear infections    Review of Systems   See above      Objective    /86 (BP Location: Left arm, Patient Position: Sitting, Cuff Size: Adult Regular)   Pulse 85   Temp 97.8  F (36.6  C) (Temporal)   Resp 18   Ht 1.664 m (5' 5.51\")   Wt 63.5 kg (140 lb)   SpO2 95%   BMI 22.94 kg/m    Body mass index is 22.94 kg/m .     Physical Exam     EXAM:  GENERAL APPEARANCE: healthy, alert and no distress  EYES: Eyes grossly normal to inspection conjunctivae and sclerae normal  HENT: ear canals normal Left TM w/purulent fluid behind it and small amount of bulging, no erythema, Right TM is normal, mouth without ulcers or lesions, oropharynx is clear without exudate or erythema, no tonsillar swelling, no tenderness or skin changes over mastoid bilaterally  NECK: no adenopathy, no asymmetry, masses  RESP: lungs clear to auscultation - no rales, rhonchi or wheezes  CV: regular rates and rhythm, normal S1 S2, no S3 or S4 and no murmur, click or rub            "

## 2022-07-26 ENCOUNTER — OFFICE VISIT (OUTPATIENT)
Dept: FAMILY MEDICINE | Facility: CLINIC | Age: 32
End: 2022-07-26
Payer: COMMERCIAL

## 2022-07-26 VITALS
SYSTOLIC BLOOD PRESSURE: 119 MMHG | BODY MASS INDEX: 23.26 KG/M2 | DIASTOLIC BLOOD PRESSURE: 84 MMHG | RESPIRATION RATE: 17 BRPM | TEMPERATURE: 97.7 F | HEART RATE: 88 BPM | OXYGEN SATURATION: 98 % | WEIGHT: 142 LBS

## 2022-07-26 DIAGNOSIS — Z00.00 ENCOUNTER FOR ROUTINE ADULT HEALTH EXAMINATION WITHOUT ABNORMAL FINDINGS: Primary | ICD-10-CM

## 2022-07-26 DIAGNOSIS — Z11.59 NEED FOR HEPATITIS C SCREENING TEST: ICD-10-CM

## 2022-07-26 DIAGNOSIS — K21.9 GASTROESOPHAGEAL REFLUX DISEASE WITHOUT ESOPHAGITIS: ICD-10-CM

## 2022-07-26 DIAGNOSIS — E55.9 VITAMIN D DEFICIENCY: ICD-10-CM

## 2022-07-26 DIAGNOSIS — R53.83 FATIGUE, UNSPECIFIED TYPE: ICD-10-CM

## 2022-07-26 LAB
ERYTHROCYTE [DISTWIDTH] IN BLOOD BY AUTOMATED COUNT: 12.3 % (ref 10–15)
HCT VFR BLD AUTO: 45.1 % (ref 35–47)
HGB BLD-MCNC: 15.5 G/DL (ref 11.7–15.7)
MCH RBC QN AUTO: 30.3 PG (ref 26.5–33)
MCHC RBC AUTO-ENTMCNC: 34.4 G/DL (ref 31.5–36.5)
MCV RBC AUTO: 88 FL (ref 78–100)
PLATELET # BLD AUTO: 253 10E3/UL (ref 150–450)
RBC # BLD AUTO: 5.11 10E6/UL (ref 3.8–5.2)
WBC # BLD AUTO: 5.3 10E3/UL (ref 4–11)

## 2022-07-26 PROCEDURE — 99395 PREV VISIT EST AGE 18-39: CPT | Performed by: PHYSICIAN ASSISTANT

## 2022-07-26 PROCEDURE — 80061 LIPID PANEL: CPT | Performed by: PHYSICIAN ASSISTANT

## 2022-07-26 PROCEDURE — 82947 ASSAY GLUCOSE BLOOD QUANT: CPT | Performed by: PHYSICIAN ASSISTANT

## 2022-07-26 PROCEDURE — 84443 ASSAY THYROID STIM HORMONE: CPT | Performed by: PHYSICIAN ASSISTANT

## 2022-07-26 PROCEDURE — 82728 ASSAY OF FERRITIN: CPT | Performed by: PHYSICIAN ASSISTANT

## 2022-07-26 PROCEDURE — 85027 COMPLETE CBC AUTOMATED: CPT | Performed by: PHYSICIAN ASSISTANT

## 2022-07-26 PROCEDURE — 36415 COLL VENOUS BLD VENIPUNCTURE: CPT | Performed by: PHYSICIAN ASSISTANT

## 2022-07-26 PROCEDURE — 86803 HEPATITIS C AB TEST: CPT | Performed by: PHYSICIAN ASSISTANT

## 2022-07-26 PROCEDURE — 82306 VITAMIN D 25 HYDROXY: CPT | Performed by: PHYSICIAN ASSISTANT

## 2022-07-26 PROCEDURE — 99214 OFFICE O/P EST MOD 30 MIN: CPT | Mod: 25 | Performed by: PHYSICIAN ASSISTANT

## 2022-07-26 RX ORDER — FAMOTIDINE 20 MG/1
20 TABLET, FILM COATED ORAL 2 TIMES DAILY
Qty: 60 TABLET | Refills: 0 | Status: SHIPPED | OUTPATIENT
Start: 2022-07-26 | End: 2023-05-15

## 2022-07-26 ASSESSMENT — ENCOUNTER SYMPTOMS
WEAKNESS: 1
HEMATOCHEZIA: 0
PALPITATIONS: 0
PARESTHESIAS: 0
NAUSEA: 0
CONSTIPATION: 0
DIZZINESS: 0
HEADACHES: 1
DYSURIA: 0
HEMATURIA: 0
FEVER: 0
CHILLS: 0
HEARTBURN: 1
MYALGIAS: 1
SORE THROAT: 0
ARTHRALGIAS: 0
NERVOUS/ANXIOUS: 0
ABDOMINAL PAIN: 0
SHORTNESS OF BREATH: 0
EYE PAIN: 0
FREQUENCY: 0
DIARRHEA: 0
COUGH: 0

## 2022-07-26 ASSESSMENT — PAIN SCALES - GENERAL: PAINLEVEL: EXTREME PAIN (8)

## 2022-07-26 NOTE — PROGRESS NOTES
SUBJECTIVE:   CC: Reji Snyder is an 32 year old woman who presents for preventive health visit.     {Patient has been advised of split billing requirements and indicates understanding: Yes  Healthy Habits:     Getting at least 3 servings of Calcium per day:  Yes    Bi-annual eye exam:  NO    Dental care twice a year:  Yes    Sleep apnea or symptoms of sleep apnea:  None    Diet:  Regular (no restrictions)    Frequency of exercise:  None    Taking medications regularly:  Yes    Medication side effects:  Muscle aches    PHQ-2 Total Score: 0    Additional concerns today:  No      Chest pressure - feels like she need to burp.   Feel like a slight burn, but mostly like a gas bubble is stuck  It does not correlate with any specific foods  No trouble swallowing    She is fatigued.    Some improvement after supplementing vitamin D  Daughter is 5 months old, and she is waking up some evenings.   Patient has trouble sleeping at baseline.       Today's PHQ-2 Score:   PHQ-2 ( 1999 [employer]) 7/26/2022   Q1: Little interest or pleasure in doing things 0   Q2: Feeling down, depressed or hopeless 0   PHQ-2 Score 0   PHQ-2 Total Score (12-17 Years)- Positive if 3 or more points; Administer PHQ-A if positive -   Q1: Little interest or pleasure in doing things Not at all   Q2: Feeling down, depressed or hopeless Not at all   PHQ-2 Score 0       Abuse: Current or Past (Physical, Sexual or Emotional) - No  Do you feel safe in your environment? Yes    Have you ever done Advance Care Planning? (For example, a Health Directive, POLST, or a discussion with a medical provider or your loved ones about your wishes): No, advance care planning information given to patient to review.  Patient plans to discuss their wishes with loved ones or provider.      Social History     Tobacco Use     Smoking status: Never Smoker     Smokeless tobacco: Never Used   Substance Use Topics     Alcohol use: No     Alcohol/week: 0.0 standard drinks        Alcohol Use 7/26/2022   Prescreen: >3 drinks/day or >7 drinks/week? No   Prescreen: >3 drinks/day or >7 drinks/week? -     Reviewed orders with patient.  Reviewed health maintenance and updated orders accordingly - Yes  Patient Active Problem List   Diagnosis     Dysmenorrhea     CARDIOVASCULAR SCREENING; LDL GOAL LESS THAN 160     Esophageal reflux     Seasonal allergic rhinitis     Vitamin D deficiency     Cystic acne     ASCUS favor benign     Presence of intrauterine contraceptive device     Past Surgical History:   Procedure Laterality Date     ------------OTHER-------------      Female Circumcision       Social History     Tobacco Use     Smoking status: Never Smoker     Smokeless tobacco: Never Used   Substance Use Topics     Alcohol use: No     Alcohol/week: 0.0 standard drinks     Family History   Problem Relation Age of Onset     Breast Cancer No family hx of      Cancer - colorectal No family hx of      Diabetes No family hx of      C.A.D. No family hx of          Current Outpatient Medications   Medication Sig Dispense Refill     famotidine (PEPCID) 20 MG tablet Take 1 tablet (20 mg) by mouth 2 times daily 60 tablet 0     fluticasone (FLONASE) 50 MCG/ACT nasal spray Spray 1 spray into both nostrils daily 9.9 mL 3     Prenatal Vit-Fe Fumarate-FA (PRENATAL MULTIVITAMIN W/IRON) 27-0.8 MG tablet TAKE 1 TABLET BY MOUTH DAILY 100 tablet 3     Vitamin D3 (CHOLECALCIFEROL) 25 mcg (1000 units) tablet Take 1 tablet (25 mcg) by mouth daily 90 tablet 3     No Known Allergies    Breast Cancer Screening:    Breast CA Risk Assessment (FHS-7) 7/26/2022   Do you have a family history of breast, colon, or ovarian cancer? No / Unknown           Pertinent mammograms are reviewed under the imaging tab.    History of abnormal Pap smear: NO - age 30-65 PAP every 5 years with negative HPV co-testing recommended  PAP / HPV Latest Ref Rng & Units 10/9/2020 8/24/2017 4/18/2016   PAP (Historical) - NIL NIL ASC-US(A)   HPV16  NEG:Negative Negative - Negative   HPV18 NEG:Negative Negative - Negative   HRHPV NEG:Negative Negative - Negative     Reviewed and updated as needed this visit by clinical staff   Tobacco  Allergies  Meds                Reviewed and updated as needed this visit by Provider     Meds                   Review of Systems   Constitutional: Negative for chills and fever.   HENT: Negative for congestion, ear pain, hearing loss and sore throat.    Eyes: Negative for pain and visual disturbance.   Respiratory: Negative for cough and shortness of breath.    Cardiovascular: Negative for chest pain, palpitations and peripheral edema.   Gastrointestinal: Positive for heartburn. Negative for abdominal pain, constipation, diarrhea, hematochezia and nausea.   Genitourinary: Negative for dysuria, frequency, genital sores, hematuria and urgency.   Musculoskeletal: Positive for myalgias. Negative for arthralgias.   Skin: Negative for rash.   Neurological: Positive for weakness and headaches. Negative for dizziness and paresthesias.   Psychiatric/Behavioral: Negative for mood changes. The patient is not nervous/anxious.           OBJECTIVE:   /84   Pulse 88   Temp 97.7  F (36.5  C) (Tympanic)   Resp 17   Wt 64.4 kg (142 lb)   SpO2 98%   BMI 23.26 kg/m    Physical Exam  Constitutional:       General: She is not in acute distress.     Appearance: She is well-developed. She is not diaphoretic.   HENT:      Head: Normocephalic.      Right Ear: External ear normal.      Left Ear: External ear normal.      Nose: Nose normal.   Eyes:      Conjunctiva/sclera: Conjunctivae normal.   Cardiovascular:      Rate and Rhythm: Normal rate and regular rhythm.      Heart sounds: Normal heart sounds.   Pulmonary:      Effort: Pulmonary effort is normal.      Breath sounds: Normal breath sounds.   Musculoskeletal:      Cervical back: Normal range of motion.   Skin:     General: Skin is warm and dry.   Neurological:      Mental Status: She  "is alert and oriented to person, place, and time.   Psychiatric:         Judgment: Judgment normal.           Diagnostic Test Results:  Labs reviewed in Epic    ASSESSMENT/PLAN:   Reji was seen today for physical.    Diagnoses and all orders for this visit:    Encounter for routine adult health examination without abnormal findings  -     Lipid panel reflex to direct LDL Fasting; Future  -     Glucose; Future  -     Lipid panel reflex to direct LDL Fasting  -     Glucose    Need for hepatitis C screening test  -     Hepatitis C Screen Reflex to HCV RNA Quant and Genotype; Future  -     Hepatitis C Screen Reflex to HCV RNA Quant and Genotype    Gastroesophageal reflux disease without esophagitis  -     famotidine (PEPCID) 20 MG tablet; Take 1 tablet (20 mg) by mouth 2 times daily    Vitamin D deficiency  -     Vitamin D Deficiency; Future  -     Vitamin D Deficiency    Fatigue, unspecified type  -     TSH with free T4 reflex; Future  -     CBC with platelets; Future  -     Ferritin; Future  -     TSH with free T4 reflex  -     CBC with platelets  -     Ferritin      Preventive care as above  Fatigue - labs evaluated as above  For gas pressure and burning in chest - trial of famotidine for 1 month, follow up at that time to discuss improvement.         COUNSELING:  Reviewed preventive health counseling, as reflected in patient instructions    Estimated body mass index is 23.26 kg/m  as calculated from the following:    Height as of 6/9/22: 1.664 m (5' 5.51\").    Weight as of this encounter: 64.4 kg (142 lb).        She reports that she has never smoked. She has never used smokeless tobacco.      Counseling Resources:  ATP IV Guidelines  Pooled Cohorts Equation Calculator  Breast Cancer Risk Calculator  BRCA-Related Cancer Risk Assessment: FHS-7 Tool  FRAX Risk Assessment  ICSI Preventive Guidelines  Dietary Guidelines for Americans, 2010  USDA's MyPlate  ASA Prophylaxis  Lung CA Screening    Daya Busch, " [facility]  [employer]

## 2022-07-27 LAB
CHOLEST SERPL-MCNC: 241 MG/DL
DEPRECATED CALCIDIOL+CALCIFEROL SERPL-MC: 45 UG/L (ref 20–75)
FASTING STATUS PATIENT QL REPORTED: YES
FASTING STATUS PATIENT QL REPORTED: YES
FERRITIN SERPL-MCNC: 25 NG/ML (ref 12–150)
GLUCOSE BLD-MCNC: 106 MG/DL (ref 70–99)
HCV AB SERPL QL IA: NONREACTIVE
HDLC SERPL-MCNC: 51 MG/DL
LDLC SERPL CALC-MCNC: 151 MG/DL
NONHDLC SERPL-MCNC: 190 MG/DL
TRIGL SERPL-MCNC: 194 MG/DL
TSH SERPL DL<=0.005 MIU/L-ACNC: 4 MU/L (ref 0.4–4)

## 2022-07-28 NOTE — RESULT ENCOUNTER NOTE
"Hamdi    Your lab tests are complete and I have reviewed the results.     - Your cholesterol is high but the American Heart Association does not recommend starting a medication to lower this at this time.  We will recheck next year.  - Your glucose (screening for diabetes) was slightly high (100-125), in the range of \"pre-diabetes\".  Try to decrease sugars and carbohydrates from your diet and increase exercise to keep those numbers down.  We'll recheck next year.  - Your TSH, a screening test for thyroid disease, was normal.  - Your Vitamin D level is normal.  - Your iron levels are normal.  - Your B12 levels are normal.  - CBC is normal - you do not have anemia.     Lifestyle changes to lower cholesterol:  1. Diet: Eating a Mediterranean diet can help lower cholesterol.   Check out this link from [facility]:  - [email or web address]  2. Exercise:  I recommend increasing exercise by one time a week to start.    The end goal for exercise is 150 minutes of exercise each week!    Diet and exercise are important!  - Improving your diet and increasing exercise has been shown to improve your overall health.  It is THE BEST preventive health measure.  It is also beneficial to mental health.   - A  or dietician are both great resources if that is available to you.           If you have any questions or concerns, please feel free to call or send a [employer] message.    Sincerely,  Cosme Busch PA-C  "

## 2022-07-29 ENCOUNTER — MYC MEDICAL ADVICE (OUTPATIENT)
Dept: FAMILY MEDICINE | Facility: CLINIC | Age: 32
End: 2022-07-29

## 2022-08-12 ENCOUNTER — TELEPHONE (OUTPATIENT)
Dept: FAMILY MEDICINE | Facility: CLINIC | Age: 32
End: 2022-08-12

## 2022-08-12 DIAGNOSIS — R53.83 FATIGUE, UNSPECIFIED TYPE: Primary | ICD-10-CM

## 2022-08-12 DIAGNOSIS — R73.01 ELEVATED FASTING GLUCOSE: ICD-10-CM

## 2022-08-12 NOTE — TELEPHONE ENCOUNTER
Please see pt's request for lab orders and place orders if appropriate, thanks!    Kayce Zacarias,  [facility]

## 2022-08-12 NOTE — TELEPHONE ENCOUNTER
Reason for Call: Request for an order or referral:    Order or referral being requested: re-check glucose and kidney & liver function    Date needed: within two weeks    Has the patient been seen by the PCP for this problem? YES    Additional comments: Patient would like to do follow up lab work from her physical in July.     Phone number Patient can be reached at:  Home number on file [phone] (home)    Best Time:  any    Can we leave a detailed message on this number?  YES    Call taken on 8/12/2022 at 2:36 PM by Ovi Huerta

## 2022-08-24 ENCOUNTER — LAB (OUTPATIENT)
Dept: LAB | Facility: CLINIC | Age: 32
End: 2022-08-24
Payer: COMMERCIAL

## 2022-08-24 DIAGNOSIS — R73.01 ELEVATED FASTING GLUCOSE: ICD-10-CM

## 2022-08-24 DIAGNOSIS — R53.83 FATIGUE, UNSPECIFIED TYPE: ICD-10-CM

## 2022-08-24 LAB — HBA1C MFR BLD: 5.5 % (ref 0–5.6)

## 2022-08-24 PROCEDURE — 80053 COMPREHEN METABOLIC PANEL: CPT

## 2022-08-24 PROCEDURE — 83036 HEMOGLOBIN GLYCOSYLATED A1C: CPT

## 2022-08-24 PROCEDURE — 36415 COLL VENOUS BLD VENIPUNCTURE: CPT

## 2022-08-25 LAB
ALBUMIN SERPL-MCNC: 4.2 G/DL (ref 3.4–5)
ALP SERPL-CCNC: 72 U/L (ref 40–150)
ALT SERPL W P-5'-P-CCNC: 21 U/L (ref 0–50)
ANION GAP SERPL CALCULATED.3IONS-SCNC: 8 MMOL/L (ref 3–14)
AST SERPL W P-5'-P-CCNC: 17 U/L (ref 0–45)
BILIRUB SERPL-MCNC: 0.4 MG/DL (ref 0.2–1.3)
BUN SERPL-MCNC: 10 MG/DL (ref 7–30)
CALCIUM SERPL-MCNC: 9.3 MG/DL (ref 8.5–10.1)
CHLORIDE BLD-SCNC: 107 MMOL/L (ref 94–109)
CO2 SERPL-SCNC: 23 MMOL/L (ref 20–32)
CREAT SERPL-MCNC: 0.49 MG/DL (ref 0.52–1.04)
GFR SERPL CREATININE-BSD FRML MDRD: >90 ML/MIN/1.73M2
GLUCOSE BLD-MCNC: 86 MG/DL (ref 70–99)
POTASSIUM BLD-SCNC: 4.2 MMOL/L (ref 3.4–5.3)
PROT SERPL-MCNC: 7.8 G/DL (ref 6.8–8.8)
SODIUM SERPL-SCNC: 138 MMOL/L (ref 133–144)

## 2022-08-26 NOTE — RESULT ENCOUNTER NOTE
Hamdi    Your lab tests are complete and I have reviewed the results.     - Your lab results look great; everything is normal.    If you have any questions or concerns, please feel free to call or send a [employer] message.    Sincerely,  Cosme Busch PA-C

## 2022-12-26 ENCOUNTER — HEALTH MAINTENANCE LETTER (OUTPATIENT)
Age: 32
End: 2022-12-26

## 2023-05-12 ENCOUNTER — NURSE TRIAGE (OUTPATIENT)
Dept: FAMILY MEDICINE | Facility: CLINIC | Age: 33
End: 2023-05-12
Payer: COMMERCIAL

## 2023-05-12 NOTE — TELEPHONE ENCOUNTER
Nurse Triage SBAR    Is this a 2nd Level Triage? NO    Situation: Pt experiencing constipation, moderate lower back pain, nausea, urgency with urination, chills, and vomited last night. She states this started yesterday morning but she is concerned it is worsening. She does have 1 or 2 bowel movements a day but states they are small like not enough is coming out.    Background: She does have to strain but denies rectal pain. No blood and states the stools are not hard. She has poor appetite and drank 2 cups of water this morning but states she doesn't drink a lot of fluids and doesn't ambulate a lot. She took Miralax last night.     Assessment: see below    Protocol Recommended Disposition:   See in Office Within 3 Days    Recommendation: Pt wants to be seen, no availability today in-person for EC, OX, CS. Pt declined MOA clinic. Is provider OK with a virtual visit or can same day be used to schedule pt early next week? Please review/advise.     Routed to provider    Does the patient meet one of the following criteria for ADS visit consideration? 16+ years old, with an [facility] PCP     TIP  Providers, please consider if this condition is appropriate for management at one of our Acute and Diagnostic Services sites.     If patient is a good candidate, please use dotphrase <dot>triageresponse and select Refer to ADS to document.    OK to leave detailed vm.    Reason for Disposition    Patient wants to be seen    Patient wants to be seen    Additional Information    Negative: Abdomen pain is main symptom and male    Negative: Abdomen pain is main symptom and female    Negative: Rectal bleeding or blood in stool is main symptom    Negative: Vomiting bile (green color)    Negative: Patient sounds very sick or weak to the triager    Negative: Constant abdominal pain lasting > 2 hours    Negative: Vomiting and abdomen looks much more swollen than usual    Negative: Rectal pain or fullness from fecal impaction (rectum full of  stool) and NOT better after SITZ bath, suppository or enema    Negative: Abdomen is more swollen than usual    Negative: Last bowel movement (BM) > 4 days ago    Negative: Leaking stool    Negative: Intermittent mild abdominal pain and fever    Negative: Passed out (i.e., fainted, collapsed and was not responding)    Negative: Shock suspected (e.g., cold/pale/clammy skin, too weak to stand, low BP, rapid pulse)    Negative: Sounds like a life-threatening emergency to the triager    Negative: Major injury to the back (e.g., MVA, fall > 10 feet or 3 meters, penetrating injury, etc.)    Negative: Pain in the upper back over the ribs (rib cage) that radiates (travels) into the chest    Negative: Pain in the upper back over the ribs (rib cage) and worsened by coughing (or clearly increases with breathing)    Negative: Back pain during pregnancy    Negative: SEVERE back pain of sudden onset and age > 60 years    Negative: SEVERE abdominal pain (e.g., excruciating)    Negative: Abdominal pain and age > 60 years    Negative: Unable to urinate (or only a few drops) and bladder feels very full    Negative: Loss of bladder or bowel control (urine or bowel incontinence; wetting self, leaking stool) of new-onset    Negative: Numbness (loss of sensation) in groin or rectal area    Negative: Pain radiates into groin, scrotum    Negative: Blood in urine (red, pink, or tea-colored)    Negative: Vomiting and pain over lower ribs of back (i.e., flank - kidney area)    Negative: Weakness of a leg or foot (e.g., unable to bear weight, dragging foot)    Negative: Patient sounds very sick or weak to the triager    Negative: Fever > 100.4 F (38.0 C) and flank pain    Negative: Pain or burning with passing urine (urination)    Negative: SEVERE back pain (e.g., excruciating, unable to do any normal activities) and not improved after pain medicine and CARE ADVICE    Negative: Numbness in an arm or hand (i.e., loss of sensation) and upper back  "pain    Negative: Numbness in a leg or foot (i.e., loss of sensation)    Negative: High-risk adult (e.g., history of cancer, history of HIV, or history of IV Drug Use)    Negative: Soft tissue infection (e.g., abscess, cellulitis) or other serious infection (e.g., bacteremia) in last 2 weeks    Negative: Painful rash with multiple small blisters grouped together (i.e., dermatomal distribution or 'band' or 'stripe')    Negative: Pain radiates into the thigh or further down the leg, and in both legs    Negative: Age > 50 and no history of prior similar back pain    Answer Assessment - Initial Assessment Questions  1. STOOL PATTERN OR FREQUENCY: \"How often do you have a bowel movement (BM)?\"  (Normal range: 3 times a day to every 3 days)  \"When was your last BM?\"          She goes 1 or 2 times a day but small amount, not enough comes out. Last time she had a small bowel movement was this morning.    2. STRAINING: \"Do you have to strain to have a BM?\"         Yes    3. RECTAL PAIN: \"Does your rectum hurt when the stool comes out?\" If Yes, ask: \"Do you have hemorrhoids? How bad is the pain?\"  (Scale 1-10; or mild, moderate, severe)        No    4. STOOL COMPOSITION: \"Are the stools hard?\"         No    5. BLOOD ON STOOLS: \"Has there been any blood on the toilet tissue or on the surface of the BM?\" If Yes, ask: \"When was the last time?\"         No    6. CHRONIC CONSTIPATION: \"Is this a new problem for you?\"  If no, ask: \"How long have you had this problem?\" (days, weeks, months)         Right now she is starting to feel nausea and vomited last night, this is new. Started yesterday morning.    7. CHANGES IN DIET OR HYDRATION: \"Have there been any recent changes in your diet?\" \"How much fluids are you drinking on a daily basis?\"  \"How much have you had to drink today?\"        Poor appetite, this morning she had 2 cups of water but she states she doesn't drink too much fluid.    8. MEDICATIONS: \"Have you been taking any new " "medications?\" \"Are you taking any narcotic pain medications?\" (e.g., Vicodin, Percocet, morphine, Dilaudid)        Gas medication is new for her, unsure what the name is. No narcotics.    9. LAXATIVES: \"Have you been using any stool softeners, laxatives, or enemas?\"  If yes, ask \"What, how often, and when was the last time?\"        Miralax last night    10. ACTIVITY:  \"How much walking do you do every day?\"  \"Has your activity level decreased in the past week?\"           Not that much walking     11. CAUSE: \"What do you think is causing the constipation?\"           Unsure    12. OTHER SYMPTOMS: \"Do you have any other symptoms?\" (e.g., abdominal pain, bloating, fever, vomiting)          Back pain, nausea, vomiting, chills    13. MEDICAL HISTORY: \"Do you have a history of hemorrhoids, rectal fissures, or rectal surgery or rectal abscess?\"            No    14. PREGNANCY: \"Is there any chance you are pregnant?\" \"When was your last menstrual period?\"          Unsure. Last menstrual period April 18th.    Protocols used: CONSTIPATION-A-OH, BACK PAIN-A-OH    SEE IN OFFICE WITHIN 3 DAYS:   * You need to be examined.   * Let me give you an appointment.      CALL BACK IF:  * Fever occurs  * Numbness or weakness occurs, or bowel/bladder problems  * Pain begins to shoot into the leg  * Pain persists over 2 weeks  * Pain becomes worse  * You become worse        Patient/Caregiver understands and will follow care advice? Other, see quirino MOCK RN  [facility]   "

## 2023-05-15 ENCOUNTER — OFFICE VISIT (OUTPATIENT)
Dept: FAMILY MEDICINE | Facility: CLINIC | Age: 33
End: 2023-05-15
Payer: COMMERCIAL

## 2023-05-15 VITALS
BODY MASS INDEX: 20.89 KG/M2 | WEIGHT: 130 LBS | HEART RATE: 78 BPM | DIASTOLIC BLOOD PRESSURE: 83 MMHG | OXYGEN SATURATION: 98 % | HEIGHT: 66 IN | SYSTOLIC BLOOD PRESSURE: 121 MMHG | RESPIRATION RATE: 20 BRPM | TEMPERATURE: 97.8 F

## 2023-05-15 DIAGNOSIS — Z13.1 SCREENING FOR DIABETES MELLITUS: ICD-10-CM

## 2023-05-15 DIAGNOSIS — R35.0 URINARY FREQUENCY: Primary | ICD-10-CM

## 2023-05-15 DIAGNOSIS — R53.83 FATIGUE, UNSPECIFIED TYPE: ICD-10-CM

## 2023-05-15 LAB
ALBUMIN UR-MCNC: 100 MG/DL
APPEARANCE UR: CLEAR
BACTERIA #/AREA URNS HPF: ABNORMAL /HPF
BILIRUB UR QL STRIP: NEGATIVE
COLOR UR AUTO: YELLOW
ERYTHROCYTE [DISTWIDTH] IN BLOOD BY AUTOMATED COUNT: 12.3 % (ref 10–15)
GLUCOSE UR STRIP-MCNC: NEGATIVE MG/DL
HCT VFR BLD AUTO: 43 % (ref 35–47)
HGB BLD-MCNC: 14.4 G/DL (ref 11.7–15.7)
HGB UR QL STRIP: ABNORMAL
KETONES UR STRIP-MCNC: NEGATIVE MG/DL
LEUKOCYTE ESTERASE UR QL STRIP: ABNORMAL
MCH RBC QN AUTO: 29.1 PG (ref 26.5–33)
MCHC RBC AUTO-ENTMCNC: 33.5 G/DL (ref 31.5–36.5)
MCV RBC AUTO: 87 FL (ref 78–100)
NITRATE UR QL: NEGATIVE
PH UR STRIP: 5.5 [PH] (ref 5–7)
PLATELET # BLD AUTO: 317 10E3/UL (ref 150–450)
RBC # BLD AUTO: 4.94 10E6/UL (ref 3.8–5.2)
RBC #/AREA URNS AUTO: >100 /HPF
SP GR UR STRIP: 1.02 (ref 1–1.03)
SQUAMOUS #/AREA URNS AUTO: ABNORMAL /LPF
UROBILINOGEN UR STRIP-ACNC: 0.2 E.U./DL
WBC # BLD AUTO: 4.8 10E3/UL (ref 4–11)
WBC #/AREA URNS AUTO: ABNORMAL /HPF

## 2023-05-15 PROCEDURE — 82947 ASSAY GLUCOSE BLOOD QUANT: CPT | Performed by: PHYSICIAN ASSISTANT

## 2023-05-15 PROCEDURE — 36415 COLL VENOUS BLD VENIPUNCTURE: CPT | Performed by: PHYSICIAN ASSISTANT

## 2023-05-15 PROCEDURE — 84443 ASSAY THYROID STIM HORMONE: CPT | Performed by: PHYSICIAN ASSISTANT

## 2023-05-15 PROCEDURE — 82565 ASSAY OF CREATININE: CPT | Performed by: PHYSICIAN ASSISTANT

## 2023-05-15 PROCEDURE — 81001 URINALYSIS AUTO W/SCOPE: CPT | Performed by: PHYSICIAN ASSISTANT

## 2023-05-15 PROCEDURE — 85027 COMPLETE CBC AUTOMATED: CPT | Performed by: PHYSICIAN ASSISTANT

## 2023-05-15 PROCEDURE — 99214 OFFICE O/P EST MOD 30 MIN: CPT | Performed by: PHYSICIAN ASSISTANT

## 2023-05-15 ASSESSMENT — PAIN SCALES - GENERAL: PAINLEVEL: SEVERE PAIN (6)

## 2023-05-15 NOTE — PROGRESS NOTES
"  Assessment & Plan   Problem List Items Addressed This Visit    None  Visit Diagnoses     Urinary frequency    -  Primary    Relevant Orders    UA with Microscopic reflex to Culture - lab collect (Completed)    Creatinine (Completed)    UA Microscopic with Reflex to Culture (Completed)    Fatigue, unspecified type        Relevant Orders    TSH with free T4 reflex (Completed)    CBC with platelets (Completed)    Creatinine (Completed)    Screening for diabetes mellitus        Relevant Orders    Glucose (Completed)         Eval for fatigue as above.   UA ordered to rule out UTI.                   Daya Busch PA-C  [facility] BLAIR Mendoza is a 33 year old, presenting for the following health issues:  Gastrointestinal Problem (Pt has had stomach pain, nausea and constipation for about 5 days) and Back Pain        5/15/2023    10:49 AM   Additional Questions   Roomed by Cecilia BARRIENTOS     History of Present Illness       Reason for visit:  Back pain    She eats 0-1 servings of fruits and vegetables daily.She consumes 1 sweetened beverage(s) daily.She exercises with enough effort to increase her heart rate 10 to 19 minutes per day.    She is taking medications regularly.     Constipation seems to make back pain worse  Some relief with BM  Had a normal BM this morning.     She has some back pain on the right side.   She also has her period right - heavy for her  LMP 5/13/23, prior to that 4/18/23.  This is a little early for her.   She was previously breastfeeding and stopped in April  Not currently using contraception    Denies dysuria  Urinary frequency.             Review of Systems         Objective    /83   Pulse 78   Temp 97.8  F (36.6  C) (Temporal)   Resp 20   Ht 1.669 m (5' 5.71\")   Wt 59 kg (130 lb)   LMP 05/13/2023 (Exact Date)   SpO2 98%   BMI 21.17 kg/m    Body mass index is 21.17 kg/m .  Physical Exam  Constitutional:       General: She is not in " acute distress.     Appearance: She is well-developed. She is not diaphoretic.   HENT:      Head: Normocephalic.      Right Ear: External ear normal.      Left Ear: External ear normal.      Nose: Nose normal.   Eyes:      Conjunctiva/sclera: Conjunctivae normal.   Pulmonary:      Effort: Pulmonary effort is normal.   Musculoskeletal:      Cervical back: Normal range of motion.   Neurological:      Mental Status: She is alert and oriented to person, place, and time.   Psychiatric:         Judgment: Judgment normal.            Results for orders placed or performed in visit on 05/15/23   Creatinine     Status: Abnormal   Result Value Ref Range    Creatinine 0.49 (L) 0.51 - 0.95 mg/dL    GFR Estimate >90 >60 mL/min/1.73m2   Glucose     Status: None   Result Value Ref Range    Glucose 93 70 - 99 mg/dL    Patient Fasting > 8hrs? Yes    CBC with platelets     Status: Normal   Result Value Ref Range    WBC Count 4.8 4.0 - 11.0 10e3/uL    RBC Count 4.94 3.80 - 5.20 10e6/uL    Hemoglobin 14.4 11.7 - 15.7 g/dL    Hematocrit 43.0 35.0 - 47.0 %    MCV 87 78 - 100 fL    MCH 29.1 26.5 - 33.0 pg    MCHC 33.5 31.5 - 36.5 g/dL    RDW 12.3 10.0 - 15.0 %    Platelet Count 317 150 - 450 10e3/uL   TSH with free T4 reflex     Status: Normal   Result Value Ref Range    TSH 2.81 0.30 - 4.20 uIU/mL   UA with Microscopic reflex to Culture - lab collect     Status: Abnormal    Specimen: Urine, NOS   Result Value Ref Range    Color Urine Yellow Colorless, Straw, Light Yellow, Yellow    Appearance Urine Clear Clear    Glucose Urine Negative Negative mg/dL    Bilirubin Urine Negative Negative    Ketones Urine Negative Negative mg/dL    Specific Gravity Urine 1.025 1.003 - 1.035    Blood Urine Large (A) Negative    pH Urine 5.5 5.0 - 7.0    Protein Albumin Urine 100 (A) Negative mg/dL    Urobilinogen Urine 0.2 0.2, 1.0 E.U./dL    Nitrite Urine Negative Negative    Leukocyte Esterase Urine Small (A) Negative   UA Microscopic with Reflex to  Culture     Status: Abnormal   Result Value Ref Range    Bacteria Urine None Seen None Seen /HPF    RBC Urine >100 (A) 0-2 /HPF /HPF    WBC Urine 0-5 0-5 /HPF /HPF    Squamous Epithelials Urine Few (A) None Seen /LPF    Narrative    Urine Culture not indicated

## 2023-05-16 LAB
CREAT SERPL-MCNC: 0.49 MG/DL (ref 0.51–0.95)
FASTING STATUS PATIENT QL REPORTED: YES
GFR SERPL CREATININE-BSD FRML MDRD: >90 ML/MIN/1.73M2
GLUCOSE SERPL-MCNC: 93 MG/DL (ref 70–99)
TSH SERPL DL<=0.005 MIU/L-ACNC: 2.81 UIU/ML (ref 0.3–4.2)

## 2023-05-18 NOTE — RESULT ENCOUNTER NOTE
Hamdi    Your lab tests are complete and I have reviewed the results.     - Your lab results look great; everything is normal.  - The urine test was inconclusive for a urinary tract infection - so if you continue to have symptoms, please let me know and we can recheck.     If you have any questions or concerns, please feel free to call or send a [employer] message.    Sincerely,  Cosme Busch PA-C

## 2023-06-01 DIAGNOSIS — E55.9 VITAMIN D DEFICIENCY: ICD-10-CM

## 2023-06-01 DIAGNOSIS — Z32.01 PREGNANCY TEST POSITIVE: ICD-10-CM

## 2023-06-01 RX ORDER — CHOLECALCIFEROL (VITAMIN D3) 25 MCG
TABLET ORAL
Qty: 90 TABLET | Refills: 0 | Status: SHIPPED | OUTPATIENT
Start: 2023-06-01 | End: 2023-10-31

## 2023-06-02 RX ORDER — PRENATAL VIT/IRON FUM/FOLIC AC 27MG-0.8MG
1 TABLET ORAL DAILY
Qty: 100 TABLET | Refills: 3 | Status: SHIPPED | OUTPATIENT
Start: 2023-06-02 | End: 2024-07-18

## 2023-06-26 ENCOUNTER — PATIENT OUTREACH (OUTPATIENT)
Dept: CARE COORDINATION | Facility: CLINIC | Age: 33
End: 2023-06-26
Payer: COMMERCIAL

## 2023-07-10 ENCOUNTER — PATIENT OUTREACH (OUTPATIENT)
Dept: CARE COORDINATION | Facility: CLINIC | Age: 33
End: 2023-07-10
Payer: COMMERCIAL

## 2023-08-02 ENCOUNTER — OFFICE VISIT (OUTPATIENT)
Dept: FAMILY MEDICINE | Facility: CLINIC | Age: 33
End: 2023-08-02
Payer: COMMERCIAL

## 2023-08-02 VITALS
WEIGHT: 133 LBS | SYSTOLIC BLOOD PRESSURE: 118 MMHG | DIASTOLIC BLOOD PRESSURE: 78 MMHG | OXYGEN SATURATION: 100 % | HEART RATE: 88 BPM | BODY MASS INDEX: 21.38 KG/M2 | HEIGHT: 66 IN | TEMPERATURE: 98 F | RESPIRATION RATE: 16 BRPM

## 2023-08-02 DIAGNOSIS — R09.A2 GLOBUS SENSATION: ICD-10-CM

## 2023-08-02 DIAGNOSIS — A04.8 H. PYLORI INFECTION: ICD-10-CM

## 2023-08-02 DIAGNOSIS — J02.9 SORE THROAT: Primary | ICD-10-CM

## 2023-08-02 DIAGNOSIS — K21.9 GASTROESOPHAGEAL REFLUX DISEASE, UNSPECIFIED WHETHER ESOPHAGITIS PRESENT: ICD-10-CM

## 2023-08-02 LAB
DEPRECATED S PYO AG THROAT QL EIA: NEGATIVE
GROUP A STREP BY PCR: NOT DETECTED

## 2023-08-02 PROCEDURE — 87651 STREP A DNA AMP PROBE: CPT | Performed by: PHYSICIAN ASSISTANT

## 2023-08-02 PROCEDURE — 99213 OFFICE O/P EST LOW 20 MIN: CPT | Performed by: PHYSICIAN ASSISTANT

## 2023-08-02 ASSESSMENT — PAIN SCALES - GENERAL: PAINLEVEL: MODERATE PAIN (5)

## 2023-08-02 ASSESSMENT — ENCOUNTER SYMPTOMS: SORE THROAT: 1

## 2023-08-02 NOTE — PATIENT INSTRUCTIONS
Collect stool sample    After collecting stool sample, start omeprazole 20 mg daily for 1 month    If not improving, follow up in clinic for re-evaluation

## 2023-08-02 NOTE — Clinical Note
Please abstract the following data from this visit with this patient into the appropriate field in Epic:  Other Tests found in the patient's chart through Chart Review/Care Everywhere:  Pap smear done by [facility] on this date: 4/6/22  Note to [employer]st[employer]: If this section is blank, no results were found via Chart Review/Care Everywhere.

## 2023-08-02 NOTE — PROGRESS NOTES
Assessment & Plan     Sore throat  - Streptococcus A Rapid Screen w/Reflex to PCR - Clinic Collect  - Group A Streptococcus PCR Throat Swab    Gastroesophageal reflux disease, unspecified whether esophagitis present  - Helicobacter pylori Antigen Stool  - omeprazole (PRILOSEC) 20 MG DR capsule  Dispense: 30 capsule; Refill: 0    Globus sensation  - omeprazole (PRILOSEC) 20 MG DR capsule  Dispense: 30 capsule; Refill: 0    A few months of throat irritation, globus sensation in setting of acid reflux and constipation. No concerning findings on exam. Will check h pylori and do trial of omeprazole x4 weeks, advised to collect stool sample PRIOR to starting omeprazole. Avoid spicy foods, NSAIDs. Avoid constipation, increase fiber, continue miralax as needed, increase hydration.     Follow up 1 month if symptoms not improved with above plan then may consider referral to ENT, sooner [facility] BLAIR Mendoza is a 33 year old, presenting for the following health issues:  Pharyngitis        8/2/2023     9:41 AM   Additional Questions   Roomed by Brooklynn JAFFE       History of Present Illness       Reason for visit:  My thor  Symptom onset:  3-4 weeks ago  Symptom intensity:  Severe  Symptom progression:  Staying the same  Had these symptoms before:  Yes  Has tried/received treatment for these symptoms:  Yes  What makes it worse:  Nathing make me feel worse  What makes it better:  Nothng make feel beter    She eats 2-3 servings of fruits and vegetables daily.She exercises with enough effort to increase her heart rate 9 or less minutes per day.  She exercises with enough effort to increase her heart rate 4 days per week.   She is taking medications regularly.     A couple months of pain in throat  Was worsening initially, now improving a little bit  Felt like something stuck in throat for awhile   Throat gets dry   Gets reflux symptoms and burning upper abdomen with  "spicy foods   No vomiting or nausea  No diarrhea   Some constipation - BM daily but has to use miralax, no blood or melena   No fevers or chills    Review of Systems   HENT:  Positive for sore throat.       Constitutional, HEENT, cardiovascular, pulmonary, gi and gu systems are negative, except as otherwise noted.      Objective    /78 (BP Location: Right arm, Patient Position: Sitting, Cuff Size: Adult Regular)   Pulse 88   Temp 98  F (36.7  C) (Tympanic)   Resp 16   Ht 1.669 m (5' 5.7\")   Wt 60.3 kg (133 lb)   LMP 07/23/2023 (Approximate)   SpO2 100%   Breastfeeding No   BMI 21.66 kg/m    Body mass index is 21.66 kg/m .  Physical Exam   GENERAL: healthy, alert and no distress  HENT: ear canals and TM's normal, nose and mouth without ulcers or lesions  NECK: no adenopathy, no asymmetry, masses, or scars and thyroid normal to palpation  RESP: lungs clear to auscultation - no rales, rhonchi or wheezes  CV: regular rate and rhythm, normal S1 S2, no S3 or S4, no murmur, click or rub, no peripheral edema  ABDOMEN: soft, nontender, no hepatosplenomegaly, no masses and bowel sounds normal  NEURO: Normal strength and tone, mentation intact and speech normal  PSYCH: mentation appears normal, affect normal/bright    Office Visit on 08/02/2023   Component Date Value Ref Range Status    Group A Strep antigen 08/02/2023 Negative  Negative Final            "

## 2023-08-03 ENCOUNTER — APPOINTMENT (OUTPATIENT)
Dept: LAB | Facility: CLINIC | Age: 33
End: 2023-08-03
Payer: COMMERCIAL

## 2023-08-03 PROCEDURE — 87338 HPYLORI STOOL AG IA: CPT | Performed by: PHYSICIAN ASSISTANT

## 2023-08-04 LAB — H PYLORI AG STL QL IA: POSITIVE

## 2023-08-04 RX ORDER — AMOXICILLIN 500 MG/1
1000 CAPSULE ORAL 2 TIMES DAILY
Qty: 56 CAPSULE | Refills: 0 | Status: SHIPPED | OUTPATIENT
Start: 2023-08-04 | End: 2023-08-18

## 2023-08-04 RX ORDER — CLARITHROMYCIN 500 MG
500 TABLET ORAL 2 TIMES DAILY
Qty: 28 TABLET | Refills: 0 | Status: SHIPPED | OUTPATIENT
Start: 2023-08-04 | End: 2023-08-18

## 2023-08-23 ENCOUNTER — TELEPHONE (OUTPATIENT)
Dept: FAMILY MEDICINE | Facility: CLINIC | Age: 33
End: 2023-08-23
Payer: COMMERCIAL

## 2023-08-23 DIAGNOSIS — A04.8 H. PYLORI INFECTION: Primary | ICD-10-CM

## 2023-08-23 NOTE — TELEPHONE ENCOUNTER
Order/Referral Request    Who is requesting: Patient/Provider     Orders being requested: h pylori test    Reason service is needed/diagnosis: follow up     When are orders needed by: as soon as available    Has this been discussed with Provider: Yes - was told to do a follow up h pylori test but patient needs another order    Does patient have a preference on a Group/Provider/Facility? [facility]    Does patient have an appointment scheduled?: Yes: 8/28/23    Where to send orders: Place orders within Epic    Could we send this information to you in [employer] or would you prefer to receive a phone call?:   Patient would prefer a phone call   Okay to leave a detailed message?: Yes at Home number on file [phone] (home)

## 2023-08-23 NOTE — TELEPHONE ENCOUNTER
Too early to recheck h pylori test.   Plan to recheck 4 weeks AFTER completing treatment - treatment was prescribed on 8/4, if she started medication right away and completed 8/18 then she would be due for recheck no sooner than 9/15/23.    Please let patient know - needs to reschedule lab appointment for 9/15 or after assuming she started medications right away aligning with dates above. Also - she needs to be OFF omeprazole for at least 2 weeks prior to leaving stool sample. Once lab appointment rescheduled, route back to me and I will place order (don't want order to be released sooner)    Terri Gonzalez PA-C on 8/23/2023 at 11:19 AM

## 2023-08-24 NOTE — TELEPHONE ENCOUNTER
Patient given message from Terri Gonzalez PA-C. Patient states that she did not start treatment until 8/6. Rescheduled the patient for 9/19. Myrna Patten RN

## 2023-09-17 ENCOUNTER — HEALTH MAINTENANCE LETTER (OUTPATIENT)
Age: 33
End: 2023-09-17

## 2023-09-19 ENCOUNTER — LAB (OUTPATIENT)
Dept: LAB | Facility: CLINIC | Age: 33
End: 2023-09-19
Payer: COMMERCIAL

## 2023-09-19 DIAGNOSIS — A04.8 H. PYLORI INFECTION: ICD-10-CM

## 2023-09-20 ENCOUNTER — APPOINTMENT (OUTPATIENT)
Dept: LAB | Facility: CLINIC | Age: 33
End: 2023-09-20
Payer: COMMERCIAL

## 2023-09-20 PROCEDURE — 87338 HPYLORI STOOL AG IA: CPT

## 2023-09-21 LAB — H PYLORI AG STL QL IA: NEGATIVE

## 2023-09-21 NOTE — RESULT ENCOUNTER NOTE
Reji,    I have reviewed your lab results. Good news - H pylori test is negative, indicating the infection was treated adequately. Please let me know if you have any further questions.    Take care,  Terri Gonzalez PA-C   9/21/2023   12:36 PM

## 2023-10-03 ENCOUNTER — OFFICE VISIT (OUTPATIENT)
Dept: FAMILY MEDICINE | Facility: CLINIC | Age: 33
End: 2023-10-03
Payer: COMMERCIAL

## 2023-10-03 VITALS
HEIGHT: 65 IN | BODY MASS INDEX: 23.09 KG/M2 | WEIGHT: 138.6 LBS | TEMPERATURE: 98.5 F | RESPIRATION RATE: 20 BRPM | HEART RATE: 84 BPM | DIASTOLIC BLOOD PRESSURE: 64 MMHG | OXYGEN SATURATION: 100 % | SYSTOLIC BLOOD PRESSURE: 118 MMHG

## 2023-10-03 DIAGNOSIS — K21.9 GASTROESOPHAGEAL REFLUX DISEASE WITHOUT ESOPHAGITIS: Primary | ICD-10-CM

## 2023-10-03 DIAGNOSIS — Z11.59 NEED FOR HEPATITIS B SCREENING TEST: ICD-10-CM

## 2023-10-03 DIAGNOSIS — Z11.59 NEED FOR HEPATITIS C SCREENING TEST: ICD-10-CM

## 2023-10-03 LAB
HBV CORE AB SERPL QL IA: REACTIVE
HBV SURFACE AG SERPL QL IA: NONREACTIVE
HCV AB SERPL QL IA: NONREACTIVE

## 2023-10-03 PROCEDURE — 99214 OFFICE O/P EST MOD 30 MIN: CPT | Performed by: FAMILY MEDICINE

## 2023-10-03 PROCEDURE — 86706 HEP B SURFACE ANTIBODY: CPT | Performed by: FAMILY MEDICINE

## 2023-10-03 PROCEDURE — 87340 HEPATITIS B SURFACE AG IA: CPT | Performed by: FAMILY MEDICINE

## 2023-10-03 PROCEDURE — 80076 HEPATIC FUNCTION PANEL: CPT | Performed by: FAMILY MEDICINE

## 2023-10-03 PROCEDURE — 86803 HEPATITIS C AB TEST: CPT | Performed by: FAMILY MEDICINE

## 2023-10-03 PROCEDURE — 86704 HEP B CORE ANTIBODY TOTAL: CPT | Performed by: FAMILY MEDICINE

## 2023-10-03 PROCEDURE — 36415 COLL VENOUS BLD VENIPUNCTURE: CPT | Performed by: FAMILY MEDICINE

## 2023-10-03 ASSESSMENT — PAIN SCALES - GENERAL: PAINLEVEL: MODERATE PAIN (5)

## 2023-10-03 NOTE — PROGRESS NOTES
Assessment & Plan   (K21.9) Gastroesophageal reflux disease without esophagitis  (primary encounter diagnosis)  Comment:   Plan: Hepatic panel (Albumin, ALT, AST, Bili, Alk         Phos, TP, omeprazole (PRILOSEC) 20 MG DR capsule              Discussed possible differential diagnosis for her symptoms. Sounds like mld GERD,so she is she is willing to try Prilosec 20  mg again and wants refill.       to see if helps with sx talked about reflux precautions etc . Suggest follow up in 4-6 week, if no improvement but sooner if problem.     (Z11.59) Need for hepatitis B screening test  Comment: siblings have positive hep B  Plan: Hepatitis B surface antigen, Hepatitis B core         antibody            (Z11.59) Need for hepatitis C screening test  Comment:   Plan: Hepatitis C Screen Reflex to HCV RNA Quant and         Genotype      Check labs. refill sent.Cares and  treatment discussed.  follow.up if problem   Patient expressed understanding and agreement with treatment plan. All patient's questions were answered, will let me know if has more later.  Medications: Rx's: Reviewed the potential side effects/complications of medications prescribed.           Mckayla Bush MD  [facility] BLAIR Mendoza is a 33 year old, presenting for the following health issues:  Labs Only (Wants to get lab work done ) and Abdominal Pain (Left abdominal pain for a few months)        10/3/2023     2:05 PM   Additional Questions   Roomed by shasha       History of Present Illness       Reason for visit:  Blood test    She eats 2-3 servings of fruits and vegetables daily.She consumes 1 sweetened beverage(s) daily.She exercises with enough effort to increase her heart rate 10 to 19 minutes per day.    She is taking medications regularly.   Her siblings have bene diagnosed with hepatitis and she thinks it is b, so she just wants to make sure she is ok . She has hx of GERD and was treated for h pylori and sx  "were better. Lately has noted bit of stomach upset feeling sometimes so just wants to make sure she is ok and also could use refill on her Prilosec as she has been  out . Her GERD sx are also much better after H pylori treatment a while ago and she had negative test after taht as well           Review of Systems   Constitutional, HEENT, cardiovascular, pulmonary, GI, , musculoskeletal, neuro, skin, endocrine and psych systems are negative, except as otherwise noted.      Objective    /64   Pulse 84   Temp 98.5  F (36.9  C) (Tympanic)   Resp 20   Ht 1.659 m (5' 5.3\")   Wt 62.9 kg (138 lb 9.6 oz)   LMP 09/13/2023   SpO2 100%   BMI 22.85 kg/m    Body mass index is 22.85 kg/m .  Physical Exam   GENERAL: healthy, alert and no distress  EYES: Eyes grossly normal to inspection, PERRL and conjunctivae and sclerae normal  RESP: lungs clear to auscultation - no rales, rhonchi or wheezes  CV: regular rate and rhythm, normal S1 S2, no S3 or S4,  ABDOMEN: soft, nontender, no hepatosplenomegaly, no masses and bowel sounds normal  SKIN: no suspicious lesions or rashes  NEURO: Normal strength and tone, mentation intact and speech normal  PSYCH: mentation appears normal, affect normal/bright        "

## 2023-10-04 LAB
ALBUMIN SERPL BCG-MCNC: 4.5 G/DL (ref 3.5–5.2)
ALP SERPL-CCNC: 60 U/L (ref 35–104)
ALT SERPL W P-5'-P-CCNC: 13 U/L (ref 0–50)
AST SERPL W P-5'-P-CCNC: 22 U/L (ref 0–45)
BILIRUB DIRECT SERPL-MCNC: <0.2 MG/DL (ref 0–0.3)
BILIRUB SERPL-MCNC: 0.2 MG/DL
HBV SURFACE AB SERPL IA-ACNC: >1000 M[IU]/ML
HBV SURFACE AB SERPL IA-ACNC: REACTIVE M[IU]/ML
PROT SERPL-MCNC: 7.5 G/DL (ref 6.4–8.3)

## 2023-10-26 ENCOUNTER — TELEPHONE (OUTPATIENT)
Dept: FAMILY MEDICINE | Facility: CLINIC | Age: 33
End: 2023-10-26

## 2023-10-31 DIAGNOSIS — E55.9 VITAMIN D DEFICIENCY: ICD-10-CM

## 2023-11-07 RX ORDER — VITAMIN B COMPLEX
1 TABLET ORAL DAILY
Qty: 90 TABLET | Refills: 3 | Status: SHIPPED | OUTPATIENT
Start: 2023-11-07

## 2024-07-18 DIAGNOSIS — Z32.01 PREGNANCY TEST POSITIVE: ICD-10-CM

## 2024-07-19 RX ORDER — PRENATAL VIT/IRON FUM/FOLIC AC 27MG-0.8MG
1 TABLET ORAL DAILY
Qty: 100 TABLET | Refills: 0 | Status: SHIPPED | OUTPATIENT
Start: 2024-07-19
